# Patient Record
Sex: FEMALE | Race: WHITE | Employment: STUDENT
[De-identification: names, ages, dates, MRNs, and addresses within clinical notes are randomized per-mention and may not be internally consistent; named-entity substitution may affect disease eponyms.]

---
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
## 2022-02-03 ENCOUNTER — HOSPITAL ENCOUNTER (EMERGENCY)
Facility: HOSPITAL | Age: 22
Discharge: HOME | End: 2022-02-03
Attending: PEDIATRICS
Payer: COMMERCIAL

## 2022-02-03 ENCOUNTER — APPOINTMENT (OUTPATIENT)
Dept: GENERAL RADIOLOGY | Facility: HOSPITAL | Age: 22
End: 2022-02-03
Attending: PEDIATRICS
Payer: COMMERCIAL

## 2022-02-03 VITALS
RESPIRATION RATE: 16 BRPM | TEMPERATURE: 98 F | OXYGEN SATURATION: 100 % | DIASTOLIC BLOOD PRESSURE: 80 MMHG | HEART RATE: 93 BPM | SYSTOLIC BLOOD PRESSURE: 116 MMHG

## 2022-02-03 DIAGNOSIS — R07.9 CHEST PAIN OF UNCERTAIN ETIOLOGY: Primary | ICD-10-CM

## 2022-02-03 LAB
ALBUMIN SERPL-MCNC: 4.1 G/DL (ref 3.4–5)
ALBUMIN/GLOB SERPL: 1.1 {RATIO} (ref 1–2)
ALP LIVER SERPL-CCNC: 64 U/L
ALT SERPL-CCNC: 51 U/L
ANION GAP SERPL CALC-SCNC: 5 MMOL/L (ref 0–18)
AST SERPL-CCNC: 32 U/L (ref 15–37)
ATRIAL RATE: 114 BPM
BASOPHILS # BLD AUTO: 0.03 X10(3) UL (ref 0–0.2)
BASOPHILS NFR BLD AUTO: 0.5 %
BILIRUB SERPL-MCNC: 0.3 MG/DL (ref 0.1–2)
BUN BLD-MCNC: 4 MG/DL (ref 7–18)
CALCIUM BLD-MCNC: 9.4 MG/DL (ref 8.5–10.1)
CHLORIDE SERPL-SCNC: 109 MMOL/L (ref 98–112)
CO2 SERPL-SCNC: 22 MMOL/L (ref 21–32)
CREAT BLD-MCNC: 0.78 MG/DL
D DIMER PPP FEU-MCNC: 0.58 UG/ML FEU (ref ?–0.5)
EOSINOPHIL # BLD AUTO: 0.05 X10(3) UL (ref 0–0.7)
EOSINOPHIL NFR BLD AUTO: 0.8 %
ERYTHROCYTE [DISTWIDTH] IN BLOOD BY AUTOMATED COUNT: 13.2 %
GLOBULIN PLAS-MCNC: 3.8 G/DL (ref 2.8–4.4)
GLUCOSE BLD-MCNC: 79 MG/DL (ref 70–99)
HCG SERPL QL: NEGATIVE
HCT VFR BLD AUTO: 40.2 %
HGB BLD-MCNC: 13.3 G/DL
IMM GRANULOCYTES # BLD AUTO: 0.02 X10(3) UL (ref 0–1)
IMM GRANULOCYTES NFR BLD: 0.3 %
LYMPHOCYTES NFR BLD AUTO: 25.5 %
MAGNESIUM SERPL-MCNC: 2 MG/DL (ref 1.7–2.8)
MCH RBC QN AUTO: 30 PG (ref 26–34)
MCHC RBC AUTO-ENTMCNC: 33.1 G/DL (ref 31–37)
MCV RBC AUTO: 90.5 FL
MONOCYTES # BLD AUTO: 0.4 X10(3) UL (ref 0.1–1)
MONOCYTES NFR BLD AUTO: 6.2 %
NEUTROPHILS # BLD AUTO: 4.32 X10 (3) UL (ref 1.5–7.7)
NEUTROPHILS # BLD AUTO: 4.32 X10(3) UL (ref 1.5–7.7)
NEUTROPHILS NFR BLD AUTO: 66.7 %
OSMOLALITY SERPL CALC.SUM OF ELEC: 278 MOSM/KG (ref 275–295)
P AXIS: 57 DEGREES
P-R INTERVAL: 124 MS
PHOSPHATE SERPL-MCNC: 3.5 MG/DL (ref 2.5–4.9)
PLATELET # BLD AUTO: 283 10(3)UL (ref 150–450)
POTASSIUM SERPL-SCNC: 4.7 MMOL/L (ref 3.5–5.1)
PROT SERPL-MCNC: 7.9 G/DL (ref 6.4–8.2)
Q-T INTERVAL: 318 MS
QRS DURATION: 78 MS
QTC CALCULATION (BEZET): 438 MS
R AXIS: 57 DEGREES
RBC # BLD AUTO: 4.44 X10(6)UL
SODIUM SERPL-SCNC: 136 MMOL/L (ref 136–145)
T AXIS: 0 DEGREES
TROPONIN I HIGH SENSITIVITY: 3 NG/L
VENTRICULAR RATE: 114 BPM
WBC # BLD AUTO: 6.5 X10(3) UL (ref 4–11)

## 2022-02-03 PROCEDURE — 83735 ASSAY OF MAGNESIUM: CPT | Performed by: PEDIATRICS

## 2022-02-03 PROCEDURE — 99284 EMERGENCY DEPT VISIT MOD MDM: CPT | Performed by: PEDIATRICS

## 2022-02-03 PROCEDURE — 84484 ASSAY OF TROPONIN QUANT: CPT | Performed by: PEDIATRICS

## 2022-02-03 PROCEDURE — 93010 ELECTROCARDIOGRAM REPORT: CPT | Performed by: PEDIATRICS

## 2022-02-03 PROCEDURE — 85025 COMPLETE CBC W/AUTO DIFF WBC: CPT | Performed by: PEDIATRICS

## 2022-02-03 PROCEDURE — 71045 X-RAY EXAM CHEST 1 VIEW: CPT | Performed by: PEDIATRICS

## 2022-02-03 PROCEDURE — 85379 FIBRIN DEGRADATION QUANT: CPT | Performed by: PEDIATRICS

## 2022-02-03 PROCEDURE — 84703 CHORIONIC GONADOTROPIN ASSAY: CPT | Performed by: PEDIATRICS

## 2022-02-03 PROCEDURE — 84100 ASSAY OF PHOSPHORUS: CPT | Performed by: PEDIATRICS

## 2022-02-03 PROCEDURE — 93005 ELECTROCARDIOGRAM TRACING: CPT

## 2022-02-03 PROCEDURE — 80053 COMPREHEN METABOLIC PANEL: CPT | Performed by: PEDIATRICS

## 2022-02-03 PROCEDURE — 96360 HYDRATION IV INFUSION INIT: CPT | Performed by: PEDIATRICS

## 2022-02-03 NOTE — ED INITIAL ASSESSMENT (HPI)
Pt here for chest pain that has been going on for a week. Pt reports substernal chest pain that goes down the left arm. Pt reports heart rate increased with the episode.

## 2022-02-15 ENCOUNTER — APPOINTMENT (OUTPATIENT)
Dept: CT IMAGING | Age: 22
End: 2022-02-15
Attending: EMERGENCY MEDICINE
Payer: COMMERCIAL

## 2022-02-15 ENCOUNTER — HOSPITAL ENCOUNTER (EMERGENCY)
Age: 22
Discharge: HOME | End: 2022-02-15
Attending: EMERGENCY MEDICINE
Payer: COMMERCIAL

## 2022-02-15 VITALS
DIASTOLIC BLOOD PRESSURE: 75 MMHG | HEART RATE: 88 BPM | WEIGHT: 151.88 LBS | HEIGHT: 66 IN | RESPIRATION RATE: 15 BRPM | TEMPERATURE: 98 F | BODY MASS INDEX: 24.41 KG/M2 | SYSTOLIC BLOOD PRESSURE: 124 MMHG | OXYGEN SATURATION: 99 %

## 2022-02-15 DIAGNOSIS — N23 RENAL COLIC: ICD-10-CM

## 2022-02-15 DIAGNOSIS — N20.0 KIDNEY STONE: Primary | ICD-10-CM

## 2022-02-15 LAB
ALBUMIN SERPL-MCNC: 4.5 G/DL (ref 3.4–5)
ALBUMIN/GLOB SERPL: 1.4 {RATIO} (ref 1–2)
ALP LIVER SERPL-CCNC: 73 U/L
ALT SERPL-CCNC: 20 U/L
ANION GAP SERPL CALC-SCNC: 11 MMOL/L (ref 0–18)
AST SERPL-CCNC: 15 U/L (ref 15–37)
B-HCG UR QL: NEGATIVE
BASOPHILS # BLD AUTO: 0.03 X10(3) UL (ref 0–0.2)
BASOPHILS NFR BLD AUTO: 0.3 %
BILIRUB SERPL-MCNC: 0.4 MG/DL (ref 0.1–2)
BILIRUB UR QL CFM: NEGATIVE
BUN BLD-MCNC: 8 MG/DL (ref 7–18)
CALCIUM BLD-MCNC: 9.6 MG/DL (ref 8.5–10.1)
CHLORIDE SERPL-SCNC: 108 MMOL/L (ref 98–112)
CLARITY UR REFRACT.AUTO: CLEAR
CO2 SERPL-SCNC: 20 MMOL/L (ref 21–32)
COLOR UR AUTO: YELLOW
CREAT BLD-MCNC: 1.08 MG/DL
EOSINOPHIL # BLD AUTO: 0.05 X10(3) UL (ref 0–0.7)
EOSINOPHIL NFR BLD AUTO: 0.5 %
ERYTHROCYTE [DISTWIDTH] IN BLOOD BY AUTOMATED COUNT: 13.1 %
GLOBULIN PLAS-MCNC: 3.2 G/DL (ref 2.8–4.4)
GLUCOSE BLD-MCNC: 106 MG/DL (ref 70–99)
GLUCOSE UR STRIP.AUTO-MCNC: NEGATIVE MG/DL
HCT VFR BLD AUTO: 39.9 %
HGB BLD-MCNC: 13.6 G/DL
IMM GRANULOCYTES # BLD AUTO: 0.03 X10(3) UL (ref 0–1)
IMM GRANULOCYTES NFR BLD: 0.3 %
KETONES UR STRIP.AUTO-MCNC: 40 MG/DL
LEUKOCYTE ESTERASE UR QL STRIP.AUTO: NEGATIVE
LYMPHOCYTES # BLD AUTO: 1.48 X10(3) UL (ref 1.5–5)
LYMPHOCYTES NFR BLD AUTO: 13.4 %
MCH RBC QN AUTO: 30.4 PG (ref 26–34)
MCHC RBC AUTO-ENTMCNC: 34.1 G/DL (ref 31–37)
MCV RBC AUTO: 89.3 FL
MONOCYTES # BLD AUTO: 0.71 X10(3) UL (ref 0.1–1)
MONOCYTES NFR BLD AUTO: 6.4 %
NEUTROPHILS # BLD AUTO: 8.73 X10 (3) UL (ref 1.5–7.7)
NEUTROPHILS NFR BLD AUTO: 79.1 %
NITRITE UR QL STRIP.AUTO: NEGATIVE
OSMOLALITY SERPL CALC.SUM OF ELEC: 287 MOSM/KG (ref 275–295)
PH UR STRIP.AUTO: 5 [PH] (ref 5–8)
PLATELET # BLD AUTO: 326 10(3)UL (ref 150–450)
POTASSIUM SERPL-SCNC: 3.6 MMOL/L (ref 3.5–5.1)
PROT SERPL-MCNC: 7.7 G/DL (ref 6.4–8.2)
RBC # BLD AUTO: 4.47 X10(6)UL
SODIUM SERPL-SCNC: 139 MMOL/L (ref 136–145)
SP GR UR STRIP.AUTO: >=1.03 (ref 1–1.03)
UROBILINOGEN UR STRIP.AUTO-MCNC: 0.2 MG/DL
WBC # BLD AUTO: 11 X10(3) UL (ref 4–11)
YEAST UR QL: PRESENT /HPF

## 2022-02-15 PROCEDURE — 99284 EMERGENCY DEPT VISIT MOD MDM: CPT

## 2022-02-15 PROCEDURE — 81001 URINALYSIS AUTO W/SCOPE: CPT | Performed by: EMERGENCY MEDICINE

## 2022-02-15 PROCEDURE — 74176 CT ABD & PELVIS W/O CONTRAST: CPT | Performed by: EMERGENCY MEDICINE

## 2022-02-15 PROCEDURE — 96361 HYDRATE IV INFUSION ADD-ON: CPT

## 2022-02-15 PROCEDURE — 96376 TX/PRO/DX INJ SAME DRUG ADON: CPT

## 2022-02-15 PROCEDURE — 96375 TX/PRO/DX INJ NEW DRUG ADDON: CPT

## 2022-02-15 PROCEDURE — 80053 COMPREHEN METABOLIC PANEL: CPT | Performed by: EMERGENCY MEDICINE

## 2022-02-15 PROCEDURE — 81025 URINE PREGNANCY TEST: CPT

## 2022-02-15 PROCEDURE — 85025 COMPLETE CBC W/AUTO DIFF WBC: CPT | Performed by: EMERGENCY MEDICINE

## 2022-02-15 PROCEDURE — 96374 THER/PROPH/DIAG INJ IV PUSH: CPT

## 2022-02-15 RX ORDER — ONDANSETRON 4 MG/1
4 TABLET, ORALLY DISINTEGRATING ORAL EVERY 4 HOURS PRN
Qty: 10 TABLET | Refills: 0 | Status: SHIPPED | OUTPATIENT
Start: 2022-02-15 | End: 2022-02-15

## 2022-02-15 RX ORDER — MORPHINE SULFATE 4 MG/ML
4 INJECTION, SOLUTION INTRAMUSCULAR; INTRAVENOUS EVERY 30 MIN PRN
Status: DISCONTINUED | OUTPATIENT
Start: 2022-02-15 | End: 2022-02-15

## 2022-02-15 RX ORDER — HYDROCODONE BITARTRATE AND ACETAMINOPHEN 5; 325 MG/1; MG/1
1-2 TABLET ORAL EVERY 6 HOURS PRN
Qty: 20 TABLET | Refills: 0 | Status: SHIPPED | OUTPATIENT
Start: 2022-02-15 | End: 2022-02-20

## 2022-02-15 RX ORDER — KETOROLAC TROMETHAMINE 15 MG/ML
15 INJECTION, SOLUTION INTRAMUSCULAR; INTRAVENOUS ONCE
Status: COMPLETED | OUTPATIENT
Start: 2022-02-15 | End: 2022-02-15

## 2022-02-15 RX ORDER — ONDANSETRON 2 MG/ML
4 INJECTION INTRAMUSCULAR; INTRAVENOUS ONCE
Status: COMPLETED | OUTPATIENT
Start: 2022-02-15 | End: 2022-02-15

## 2022-02-15 RX ORDER — METOCLOPRAMIDE 10 MG/1
10 TABLET ORAL 3 TIMES DAILY PRN
Qty: 20 TABLET | Refills: 0 | Status: SHIPPED | OUTPATIENT
Start: 2022-02-15 | End: 2022-03-17

## 2022-03-27 ENCOUNTER — HOSPITAL ENCOUNTER (EMERGENCY)
Age: 22
Discharge: HOME | End: 2022-03-27
Attending: EMERGENCY MEDICINE
Payer: COMMERCIAL

## 2022-03-27 ENCOUNTER — APPOINTMENT (OUTPATIENT)
Dept: GENERAL RADIOLOGY | Age: 22
End: 2022-03-27
Attending: EMERGENCY MEDICINE
Payer: COMMERCIAL

## 2022-03-27 VITALS
HEART RATE: 85 BPM | BODY MASS INDEX: 22.5 KG/M2 | WEIGHT: 140 LBS | RESPIRATION RATE: 16 BRPM | DIASTOLIC BLOOD PRESSURE: 73 MMHG | TEMPERATURE: 99 F | HEIGHT: 66 IN | OXYGEN SATURATION: 100 % | SYSTOLIC BLOOD PRESSURE: 114 MMHG

## 2022-03-27 DIAGNOSIS — R55 SYNCOPE, UNSPECIFIED SYNCOPE TYPE: Primary | ICD-10-CM

## 2022-03-27 DIAGNOSIS — R07.89 CHEST PAIN, ATYPICAL: ICD-10-CM

## 2022-03-27 LAB
ALBUMIN SERPL-MCNC: 4.2 G/DL (ref 3.4–5)
ALBUMIN/GLOB SERPL: 1.3 {RATIO} (ref 1–2)
ALP LIVER SERPL-CCNC: 65 U/L
ALT SERPL-CCNC: 20 U/L
ANION GAP SERPL CALC-SCNC: 7 MMOL/L (ref 0–18)
AST SERPL-CCNC: 14 U/L (ref 15–37)
BASOPHILS # BLD AUTO: 0.03 X10(3) UL (ref 0–0.2)
BASOPHILS NFR BLD AUTO: 0.4 %
BILIRUB SERPL-MCNC: 0.5 MG/DL (ref 0.1–2)
BUN BLD-MCNC: 7 MG/DL (ref 7–18)
CHLORIDE SERPL-SCNC: 105 MMOL/L (ref 98–112)
CO2 SERPL-SCNC: 25 MMOL/L (ref 21–32)
CREAT BLD-MCNC: 0.72 MG/DL
D DIMER PPP FEU-MCNC: 0.48 UG/ML FEU (ref ?–0.5)
EOSINOPHIL # BLD AUTO: 0.07 X10(3) UL (ref 0–0.7)
EOSINOPHIL NFR BLD AUTO: 1 %
ERYTHROCYTE [DISTWIDTH] IN BLOOD BY AUTOMATED COUNT: 13.1 %
GLOBULIN PLAS-MCNC: 3.2 G/DL (ref 2.8–4.4)
GLUCOSE BLD-MCNC: 73 MG/DL (ref 70–99)
HCG SERPL QL: NEGATIVE
HCT VFR BLD AUTO: 40 %
HGB BLD-MCNC: 13.2 G/DL
IMM GRANULOCYTES # BLD AUTO: 0.01 X10(3) UL (ref 0–1)
IMM GRANULOCYTES NFR BLD: 0.1 %
LYMPHOCYTES # BLD AUTO: 1.78 X10(3) UL (ref 1.5–5)
LYMPHOCYTES NFR BLD AUTO: 25.9 %
MAGNESIUM SERPL-MCNC: 1.7 MG/DL (ref 1.6–2.6)
MCH RBC QN AUTO: 30 PG (ref 26–34)
MCHC RBC AUTO-ENTMCNC: 33 G/DL (ref 31–37)
MCV RBC AUTO: 90.9 FL
MONOCYTES # BLD AUTO: 0.34 X10(3) UL (ref 0.1–1)
MONOCYTES NFR BLD AUTO: 4.9 %
NEUTROPHILS # BLD AUTO: 4.65 X10 (3) UL (ref 1.5–7.7)
NEUTROPHILS # BLD AUTO: 4.65 X10(3) UL (ref 1.5–7.7)
NEUTROPHILS NFR BLD AUTO: 67.7 %
OSMOLALITY SERPL CALC.SUM OF ELEC: 281 MOSM/KG (ref 275–295)
PLATELET # BLD AUTO: 326 10(3)UL (ref 150–450)
POTASSIUM SERPL-SCNC: 3.6 MMOL/L (ref 3.5–5.1)
PROT SERPL-MCNC: 7.4 G/DL (ref 6.4–8.2)
RBC # BLD AUTO: 4.4 X10(6)UL
SODIUM SERPL-SCNC: 137 MMOL/L (ref 136–145)
TROPONIN I HIGH SENSITIVITY: 4 NG/L
WBC # BLD AUTO: 6.9 X10(3) UL (ref 4–11)

## 2022-03-27 PROCEDURE — 80053 COMPREHEN METABOLIC PANEL: CPT | Performed by: EMERGENCY MEDICINE

## 2022-03-27 PROCEDURE — 83735 ASSAY OF MAGNESIUM: CPT | Performed by: EMERGENCY MEDICINE

## 2022-03-27 PROCEDURE — 96360 HYDRATION IV INFUSION INIT: CPT

## 2022-03-27 PROCEDURE — 71045 X-RAY EXAM CHEST 1 VIEW: CPT | Performed by: EMERGENCY MEDICINE

## 2022-03-27 PROCEDURE — 84484 ASSAY OF TROPONIN QUANT: CPT | Performed by: EMERGENCY MEDICINE

## 2022-03-27 PROCEDURE — 93010 ELECTROCARDIOGRAM REPORT: CPT

## 2022-03-27 PROCEDURE — 93005 ELECTROCARDIOGRAM TRACING: CPT

## 2022-03-27 PROCEDURE — 84703 CHORIONIC GONADOTROPIN ASSAY: CPT | Performed by: EMERGENCY MEDICINE

## 2022-03-27 PROCEDURE — 85379 FIBRIN DEGRADATION QUANT: CPT | Performed by: EMERGENCY MEDICINE

## 2022-03-27 PROCEDURE — 99285 EMERGENCY DEPT VISIT HI MDM: CPT

## 2022-03-27 PROCEDURE — 85025 COMPLETE CBC W/AUTO DIFF WBC: CPT | Performed by: EMERGENCY MEDICINE

## 2022-03-27 NOTE — ED INITIAL ASSESSMENT (HPI)
Pt reports for 2 weeks feeling dizziness and intermittent  Chest pain, last 30 minutes approx. Reports having syncopal episodes.  Last episode this am

## 2022-03-28 NOTE — ED QUICK NOTES
Patient with c/o of burning to the IV site, requesting to only have one bag of 0.9 NS instead of two. MD notified.

## 2022-03-29 LAB
ATRIAL RATE: 120 BPM
P AXIS: 76 DEGREES
P-R INTERVAL: 128 MS
Q-T INTERVAL: 300 MS
QRS DURATION: 74 MS
QTC CALCULATION (BEZET): 424 MS
R AXIS: 77 DEGREES
T AXIS: -55 DEGREES
VENTRICULAR RATE: 120 BPM

## 2023-10-25 ENCOUNTER — APPOINTMENT (OUTPATIENT)
Dept: GENERAL RADIOLOGY | Age: 23
End: 2023-10-25
Attending: EMERGENCY MEDICINE
Payer: COMMERCIAL

## 2023-10-25 ENCOUNTER — HOSPITAL ENCOUNTER (EMERGENCY)
Age: 23
Discharge: HOME | End: 2023-10-26
Attending: EMERGENCY MEDICINE
Payer: COMMERCIAL

## 2023-10-25 DIAGNOSIS — R51.9 NONINTRACTABLE HEADACHE, UNSPECIFIED CHRONICITY PATTERN, UNSPECIFIED HEADACHE TYPE: Primary | ICD-10-CM

## 2023-10-25 PROCEDURE — 96375 TX/PRO/DX INJ NEW DRUG ADDON: CPT

## 2023-10-25 PROCEDURE — 72072 X-RAY EXAM THORAC SPINE 3VWS: CPT | Performed by: EMERGENCY MEDICINE

## 2023-10-25 PROCEDURE — 99284 EMERGENCY DEPT VISIT MOD MDM: CPT

## 2023-10-25 PROCEDURE — 99285 EMERGENCY DEPT VISIT HI MDM: CPT

## 2023-10-25 PROCEDURE — 96374 THER/PROPH/DIAG INJ IV PUSH: CPT

## 2023-10-25 PROCEDURE — 96361 HYDRATE IV INFUSION ADD-ON: CPT

## 2023-10-25 PROCEDURE — 72110 X-RAY EXAM L-2 SPINE 4/>VWS: CPT | Performed by: EMERGENCY MEDICINE

## 2023-10-25 RX ORDER — ATOMOXETINE 60 MG/1
60 CAPSULE ORAL EVERY MORNING
COMMUNITY
Start: 2023-03-01

## 2023-10-25 RX ORDER — DIPHENHYDRAMINE HYDROCHLORIDE 50 MG/ML
25 INJECTION INTRAMUSCULAR; INTRAVENOUS ONCE
Status: COMPLETED | OUTPATIENT
Start: 2023-10-25 | End: 2023-10-26

## 2023-10-25 RX ORDER — PHENAZOPYRIDINE HYDROCHLORIDE 100 MG/1
200 TABLET, FILM COATED ORAL 3 TIMES DAILY PRN
COMMUNITY
Start: 2022-11-13

## 2023-10-25 RX ORDER — METOCLOPRAMIDE HYDROCHLORIDE 5 MG/ML
10 INJECTION INTRAMUSCULAR; INTRAVENOUS ONCE
Status: COMPLETED | OUTPATIENT
Start: 2023-10-25 | End: 2023-10-25

## 2023-10-25 RX ORDER — LURASIDONE HYDROCHLORIDE 20 MG/1
20 TABLET, FILM COATED ORAL NIGHTLY
COMMUNITY
Start: 2023-02-27

## 2023-10-25 RX ORDER — HYDROXYZINE 50 MG/1
25 TABLET, FILM COATED ORAL EVERY 8 HOURS PRN
COMMUNITY
Start: 2023-02-27

## 2023-10-25 RX ORDER — KETOROLAC TROMETHAMINE 15 MG/ML
15 INJECTION, SOLUTION INTRAMUSCULAR; INTRAVENOUS ONCE
Status: COMPLETED | OUTPATIENT
Start: 2023-10-25 | End: 2023-10-25

## 2023-10-25 RX ORDER — METOCLOPRAMIDE 10 MG/1
10 TABLET ORAL EVERY 6 HOURS PRN
COMMUNITY
Start: 2022-09-22

## 2023-10-26 VITALS
OXYGEN SATURATION: 98 % | BODY MASS INDEX: 23 KG/M2 | TEMPERATURE: 98 F | HEART RATE: 77 BPM | SYSTOLIC BLOOD PRESSURE: 108 MMHG | RESPIRATION RATE: 18 BRPM | DIASTOLIC BLOOD PRESSURE: 71 MMHG | WEIGHT: 140 LBS

## 2023-10-26 PROCEDURE — 96376 TX/PRO/DX INJ SAME DRUG ADON: CPT

## 2023-10-26 PROCEDURE — 96375 TX/PRO/DX INJ NEW DRUG ADDON: CPT

## 2023-10-26 PROCEDURE — 96361 HYDRATE IV INFUSION ADD-ON: CPT

## 2023-10-26 RX ORDER — ONDANSETRON 2 MG/ML
4 INJECTION INTRAMUSCULAR; INTRAVENOUS ONCE
Status: COMPLETED | OUTPATIENT
Start: 2023-10-26 | End: 2023-10-26

## 2023-10-26 RX ORDER — ONDANSETRON 2 MG/ML
INJECTION INTRAMUSCULAR; INTRAVENOUS
Status: COMPLETED
Start: 2023-10-26 | End: 2023-10-26

## 2023-10-26 RX ORDER — DIAZEPAM 5 MG/1
5 TABLET ORAL ONCE
Status: COMPLETED | OUTPATIENT
Start: 2023-10-26 | End: 2023-10-26

## 2023-10-26 RX ORDER — KETOROLAC TROMETHAMINE 15 MG/ML
15 INJECTION, SOLUTION INTRAMUSCULAR; INTRAVENOUS ONCE
Status: COMPLETED | OUTPATIENT
Start: 2023-10-26 | End: 2023-10-26

## 2023-10-26 RX ORDER — KETOROLAC TROMETHAMINE 15 MG/ML
INJECTION, SOLUTION INTRAMUSCULAR; INTRAVENOUS
Status: COMPLETED
Start: 2023-10-26 | End: 2023-10-26

## 2023-10-26 NOTE — DISCHARGE INSTRUCTIONS
Please follow-up with your neurologist discussed your ER visit and your migraine headache. Return if symptoms worsen progress or if you develop double vision.

## 2023-10-29 ENCOUNTER — HOSPITAL ENCOUNTER (EMERGENCY)
Facility: HOSPITAL | Age: 23
Discharge: HOME | End: 2023-10-29
Attending: STUDENT IN AN ORGANIZED HEALTH CARE EDUCATION/TRAINING PROGRAM
Payer: COMMERCIAL

## 2023-10-29 ENCOUNTER — APPOINTMENT (OUTPATIENT)
Dept: CT IMAGING | Facility: HOSPITAL | Age: 23
End: 2023-10-29
Attending: STUDENT IN AN ORGANIZED HEALTH CARE EDUCATION/TRAINING PROGRAM
Payer: COMMERCIAL

## 2023-10-29 VITALS
DIASTOLIC BLOOD PRESSURE: 84 MMHG | TEMPERATURE: 99 F | SYSTOLIC BLOOD PRESSURE: 125 MMHG | HEIGHT: 66 IN | HEART RATE: 118 BPM | OXYGEN SATURATION: 96 % | WEIGHT: 143.31 LBS | BODY MASS INDEX: 23.03 KG/M2 | RESPIRATION RATE: 19 BRPM

## 2023-10-29 DIAGNOSIS — G43.909 MIGRAINE WITHOUT STATUS MIGRAINOSUS, NOT INTRACTABLE, UNSPECIFIED MIGRAINE TYPE: Primary | ICD-10-CM

## 2023-10-29 LAB
ANION GAP SERPL CALC-SCNC: 3 MMOL/L (ref 0–18)
BASOPHILS # BLD AUTO: 0.03 X10(3) UL (ref 0–0.2)
BASOPHILS NFR BLD AUTO: 0.3 %
BUN BLD-MCNC: 12 MG/DL (ref 7–18)
CALCIUM BLD-MCNC: 9.1 MG/DL (ref 8.5–10.1)
CHLORIDE SERPL-SCNC: 107 MMOL/L (ref 98–112)
CO2 SERPL-SCNC: 29 MMOL/L (ref 21–32)
CREAT BLD-MCNC: 1.02 MG/DL
EGFRCR SERPLBLD CKD-EPI 2021: 80 ML/MIN/1.73M2 (ref 60–?)
EOSINOPHIL # BLD AUTO: 0.09 X10(3) UL (ref 0–0.7)
EOSINOPHIL NFR BLD AUTO: 0.9 %
ERYTHROCYTE [DISTWIDTH] IN BLOOD BY AUTOMATED COUNT: 13.8 %
GLUCOSE BLD-MCNC: 102 MG/DL (ref 70–99)
HCT VFR BLD AUTO: 38 %
HGB BLD-MCNC: 12.3 G/DL
IMM GRANULOCYTES # BLD AUTO: 0.03 X10(3) UL (ref 0–1)
IMM GRANULOCYTES NFR BLD: 0.3 %
LYMPHOCYTES # BLD AUTO: 2 X10(3) UL (ref 1–4)
LYMPHOCYTES NFR BLD AUTO: 19.3 %
MCH RBC QN AUTO: 27.6 PG (ref 26–34)
MCHC RBC AUTO-ENTMCNC: 32.4 G/DL (ref 31–37)
MCV RBC AUTO: 85.4 FL
MONOCYTES # BLD AUTO: 0.48 X10(3) UL (ref 0.1–1)
MONOCYTES NFR BLD AUTO: 4.6 %
NEUTROPHILS # BLD AUTO: 7.73 X10 (3) UL (ref 1.5–7.7)
NEUTROPHILS # BLD AUTO: 7.73 X10(3) UL (ref 1.5–7.7)
NEUTROPHILS NFR BLD AUTO: 74.6 %
OSMOLALITY SERPL CALC.SUM OF ELEC: 288 MOSM/KG (ref 275–295)
PLATELET # BLD AUTO: 372 10(3)UL (ref 150–450)
POTASSIUM SERPL-SCNC: 4.2 MMOL/L (ref 3.5–5.1)
RBC # BLD AUTO: 4.45 X10(6)UL
SARS-COV-2 RNA RESP QL NAA+PROBE: NOT DETECTED
SODIUM SERPL-SCNC: 139 MMOL/L (ref 136–145)
WBC # BLD AUTO: 10.4 X10(3) UL (ref 4–11)

## 2023-10-29 PROCEDURE — 80048 BASIC METABOLIC PNL TOTAL CA: CPT | Performed by: STUDENT IN AN ORGANIZED HEALTH CARE EDUCATION/TRAINING PROGRAM

## 2023-10-29 PROCEDURE — 64450 NJX AA&/STRD OTHER PN/BRANCH: CPT

## 2023-10-29 PROCEDURE — 96361 HYDRATE IV INFUSION ADD-ON: CPT

## 2023-10-29 PROCEDURE — 85025 COMPLETE CBC W/AUTO DIFF WBC: CPT | Performed by: STUDENT IN AN ORGANIZED HEALTH CARE EDUCATION/TRAINING PROGRAM

## 2023-10-29 PROCEDURE — 99284 EMERGENCY DEPT VISIT MOD MDM: CPT

## 2023-10-29 PROCEDURE — 70450 CT HEAD/BRAIN W/O DYE: CPT | Performed by: STUDENT IN AN ORGANIZED HEALTH CARE EDUCATION/TRAINING PROGRAM

## 2023-10-29 PROCEDURE — 96374 THER/PROPH/DIAG INJ IV PUSH: CPT

## 2023-10-29 PROCEDURE — 99285 EMERGENCY DEPT VISIT HI MDM: CPT

## 2023-10-29 RX ORDER — BUPIVACAINE HYDROCHLORIDE 5 MG/ML
5 INJECTION, SOLUTION EPIDURAL; INTRACAUDAL ONCE
Status: COMPLETED | OUTPATIENT
Start: 2023-10-29 | End: 2023-10-29

## 2023-10-29 RX ORDER — DROPERIDOL 2.5 MG/ML
2.5 INJECTION, SOLUTION INTRAMUSCULAR; INTRAVENOUS ONCE
Status: COMPLETED | OUTPATIENT
Start: 2023-10-29 | End: 2023-10-29

## 2023-10-29 NOTE — ED INITIAL ASSESSMENT (HPI)
Pt ambulatory to er with ha since Tuesday- tx at PED and sent home - states home with neck pain  Took home meds = no relief this am  Denies injuries  Hx of csf leakage - Dr Jose Luis Land

## 2024-03-12 ENCOUNTER — APPOINTMENT (OUTPATIENT)
Dept: CT IMAGING | Age: 24
End: 2024-03-12
Attending: STUDENT IN AN ORGANIZED HEALTH CARE EDUCATION/TRAINING PROGRAM

## 2024-03-12 ENCOUNTER — HOSPITAL ENCOUNTER (EMERGENCY)
Age: 24
Discharge: HOME | End: 2024-03-12

## 2024-03-12 ENCOUNTER — APPOINTMENT (OUTPATIENT)
Dept: GENERAL RADIOLOGY | Age: 24
End: 2024-03-12
Attending: STUDENT IN AN ORGANIZED HEALTH CARE EDUCATION/TRAINING PROGRAM

## 2024-03-12 VITALS
RESPIRATION RATE: 18 BRPM | SYSTOLIC BLOOD PRESSURE: 104 MMHG | OXYGEN SATURATION: 98 % | HEART RATE: 118 BPM | TEMPERATURE: 97.7 F | DIASTOLIC BLOOD PRESSURE: 72 MMHG
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
## 2024-03-12 DIAGNOSIS — M54.2 NECK PAIN ON RIGHT SIDE: ICD-10-CM

## 2024-03-12 LAB
ATRIAL RATE (BPM): 142
HCG UR QL: NEGATIVE
P AXIS (DEGREES): 57
PR-INTERVAL (MSEC): 113
QRS-INTERVAL (MSEC): 80
QT-INTERVAL (MSEC): 291
QTC: 447
R AXIS (DEGREES): 78
REPORT TEXT: NORMAL
T AXIS (DEGREES): -18
VENTRICULAR RATE EKG/MIN (BPM): 142

## 2024-03-12 PROCEDURE — 70450 CT HEAD/BRAIN W/O DYE: CPT

## 2024-03-12 PROCEDURE — 84703 CHORIONIC GONADOTROPIN ASSAY: CPT

## 2024-03-12 PROCEDURE — 10002803 HB RX 637: Performed by: STUDENT IN AN ORGANIZED HEALTH CARE EDUCATION/TRAINING PROGRAM

## 2024-03-12 PROCEDURE — 93010 ELECTROCARDIOGRAM REPORT: CPT | Performed by: INTERNAL MEDICINE

## 2024-03-12 PROCEDURE — 72128 CT CHEST SPINE W/O DYE: CPT

## 2024-03-12 PROCEDURE — 99285 EMERGENCY DEPT VISIT HI MDM: CPT

## 2024-03-12 PROCEDURE — 10004651 HB RX, NO CHARGE ITEM: Performed by: STUDENT IN AN ORGANIZED HEALTH CARE EDUCATION/TRAINING PROGRAM

## 2024-03-12 PROCEDURE — 72125 CT NECK SPINE W/O DYE: CPT

## 2024-03-12 PROCEDURE — 99284 EMERGENCY DEPT VISIT MOD MDM: CPT | Performed by: STUDENT IN AN ORGANIZED HEALTH CARE EDUCATION/TRAINING PROGRAM

## 2024-03-12 PROCEDURE — 93005 ELECTROCARDIOGRAM TRACING: CPT | Performed by: STUDENT IN AN ORGANIZED HEALTH CARE EDUCATION/TRAINING PROGRAM

## 2024-03-12 PROCEDURE — 71046 X-RAY EXAM CHEST 2 VIEWS: CPT

## 2024-03-12 RX ORDER — ACETAMINOPHEN 325 MG/1
975 TABLET ORAL ONCE
Status: COMPLETED | OUTPATIENT
Start: 2024-03-12 | End: 2024-03-12

## 2024-03-12 RX ORDER — DIAZEPAM 5 MG/1
5 TABLET ORAL ONCE
Status: COMPLETED | OUTPATIENT
Start: 2024-03-12 | End: 2024-03-12

## 2024-03-12 RX ORDER — DIAZEPAM 5 MG/1
5 TABLET ORAL EVERY 8 HOURS PRN
Qty: 10 TABLET | Refills: 0 | Status: SHIPPED | OUTPATIENT
Start: 2024-03-12

## 2024-03-12 RX ADMIN — ACETAMINOPHEN 975 MG: 325 TABLET ORAL at 10:05

## 2024-03-12 RX ADMIN — DIAZEPAM 5 MG: 5 TABLET ORAL at 12:17

## 2024-03-12 ASSESSMENT — PAIN SCALES - GENERAL
PAINLEVEL_OUTOF10: 2
PAINLEVEL_OUTOF10: 8

## 2024-03-12 ASSESSMENT — ENCOUNTER SYMPTOMS
ABDOMINAL PAIN: 0
BACK PAIN: 1

## 2024-05-03 ENCOUNTER — PATIENT MESSAGE (OUTPATIENT)
Dept: INTEGRATIVE MEDICINE | Facility: CLINIC | Age: 24
End: 2024-05-03

## 2024-05-03 ENCOUNTER — OFFICE VISIT (OUTPATIENT)
Dept: INTEGRATIVE MEDICINE | Facility: CLINIC | Age: 24
End: 2024-05-03
Payer: COMMERCIAL

## 2024-05-03 VITALS — DIASTOLIC BLOOD PRESSURE: 80 MMHG | SYSTOLIC BLOOD PRESSURE: 100 MMHG | HEART RATE: 130 BPM | OXYGEN SATURATION: 97 %

## 2024-05-03 DIAGNOSIS — G90.1 DYSAUTONOMIA (HCC): ICD-10-CM

## 2024-05-03 DIAGNOSIS — R65.10 SYSTEMIC INFLAMMATORY RESPONSE SYNDROME DUE TO NON-INFECTIOUS PROCESS WITHOUT ACUTE ORGAN DYSFUNCTION (HCC): Primary | ICD-10-CM

## 2024-05-03 DIAGNOSIS — D84.1 DISORDER OF COMPLEMENT (HCC): ICD-10-CM

## 2024-05-03 DIAGNOSIS — R53.83 OTHER FATIGUE: ICD-10-CM

## 2024-05-03 PROCEDURE — 3074F SYST BP LT 130 MM HG: CPT | Performed by: PHYSICIAN ASSISTANT

## 2024-05-03 PROCEDURE — 3079F DIAST BP 80-89 MM HG: CPT | Performed by: PHYSICIAN ASSISTANT

## 2024-05-03 PROCEDURE — 99215 OFFICE O/P EST HI 40 MIN: CPT | Performed by: PHYSICIAN ASSISTANT

## 2024-05-03 RX ORDER — TRAZODONE HYDROCHLORIDE 50 MG/1
50 TABLET ORAL NIGHTLY
COMMUNITY
Start: 2024-01-23

## 2024-05-03 RX ORDER — MELATONIN
1 NIGHTLY PRN
COMMUNITY
Start: 2024-04-07

## 2024-05-03 RX ORDER — BUSPIRONE HYDROCHLORIDE 10 MG/1
TABLET ORAL
COMMUNITY
Start: 2024-04-01

## 2024-05-03 RX ORDER — TRAMADOL HYDROCHLORIDE 50 MG/1
1 TABLET ORAL EVERY 12 HOURS PRN
COMMUNITY
Start: 2023-02-03

## 2024-05-03 RX ORDER — LAMOTRIGINE 200 MG/1
200 TABLET ORAL EVERY MORNING
COMMUNITY
Start: 2024-04-17

## 2024-05-03 RX ORDER — OMEPRAZOLE 20 MG/1
CAPSULE, DELAYED RELEASE ORAL
COMMUNITY
Start: 2024-04-29

## 2024-05-03 RX ORDER — LACTOBACILLUS RHAMNOSUS GG 10B CELL
1 CAPSULE ORAL EVERY MORNING
COMMUNITY

## 2024-05-03 RX ORDER — PROMETHAZINE HYDROCHLORIDE 12.5 MG/1
1 TABLET ORAL EVERY 6 HOURS PRN
COMMUNITY
Start: 2024-03-08

## 2024-05-03 RX ORDER — RISPERIDONE 1 MG/1
TABLET ORAL
COMMUNITY
Start: 2024-04-01

## 2024-05-03 RX ORDER — BUTALBITAL, ACETAMINOPHEN, CAFFEINE AND CODEINE PHOSPHATE 50; 325; 40; 30 MG/1; MG/1; MG/1; MG/1
1 CAPSULE ORAL
COMMUNITY

## 2024-05-03 RX ORDER — RISPERIDONE 0.5 MG/1
0.5 TABLET ORAL NIGHTLY
COMMUNITY
Start: 2024-04-02

## 2024-05-03 RX ORDER — ERENUMAB-AOOE 140 MG/ML
INJECTION, SOLUTION SUBCUTANEOUS
COMMUNITY
Start: 2024-05-02

## 2024-05-03 NOTE — PATIENT INSTRUCTIONS
Please get testing at [facility]     [email or web address]    Please perform the VCS test above and give me the results
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
## 2024-05-08 ENCOUNTER — HOSPITAL ENCOUNTER (OUTPATIENT)
Age: 24
Discharge: HOME | End: 2024-05-08
Attending: EMERGENCY MEDICINE
Payer: COMMERCIAL

## 2024-05-08 VITALS
BODY MASS INDEX: 23 KG/M2 | OXYGEN SATURATION: 100 % | HEIGHT: 66 IN | TEMPERATURE: 99 F | DIASTOLIC BLOOD PRESSURE: 82 MMHG | SYSTOLIC BLOOD PRESSURE: 117 MMHG | RESPIRATION RATE: 18 BRPM | HEART RATE: 101 BPM

## 2024-05-08 DIAGNOSIS — E86.0 DEHYDRATION: Primary | ICD-10-CM

## 2024-05-08 LAB
#MXD IC: 0.4 X10ˆ3/UL (ref 0.1–1)
BILIRUB UR QL STRIP: NEGATIVE
BUN BLD-MCNC: 9 MG/DL (ref 7–18)
CHLORIDE BLD-SCNC: 104 MMOL/L (ref 98–112)
CO2 BLD-SCNC: 25 MMOL/L (ref 21–32)
COLOR UR: YELLOW
CREAT BLD-MCNC: 0.6 MG/DL
EGFRCR SERPLBLD CKD-EPI 2021: 131 ML/MIN/1.73M2 (ref 60–?)
GLUCOSE BLD-MCNC: 116 MG/DL (ref 70–99)
GLUCOSE UR STRIP-MCNC: NEGATIVE MG/DL
HCT VFR BLD AUTO: 32.4 %
HCT VFR BLD CALC: 31 %
HGB BLD-MCNC: 10 G/DL
HGB UR QL STRIP: NEGATIVE
ISTAT IONIZED CALCIUM FOR CHEM 8: 1.22 MMOL/L (ref 1.12–1.32)
KETONES UR STRIP-MCNC: NEGATIVE MG/DL
LEUKOCYTE ESTERASE UR QL STRIP: NEGATIVE
LYMPHOCYTES # BLD AUTO: 1.5 X10ˆ3/UL (ref 1–4)
LYMPHOCYTES NFR BLD AUTO: 20.5 %
MCH RBC QN AUTO: 24.9 PG (ref 26–34)
MCHC RBC AUTO-ENTMCNC: 30.9 G/DL (ref 31–37)
MCV RBC AUTO: 80.8 FL (ref 80–100)
MIXED CELL %: 6.3 %
NEUTROPHILS # BLD AUTO: 5.2 X10ˆ3/UL (ref 1.5–7.7)
NEUTROPHILS NFR BLD AUTO: 73.2 %
NITRITE UR QL STRIP: NEGATIVE
PH UR STRIP: 6 [PH]
PLATELET # BLD AUTO: 435 X10ˆ3/UL (ref 150–450)
POTASSIUM BLD-SCNC: 3.9 MMOL/L (ref 3.6–5.1)
PROT UR STRIP-MCNC: NEGATIVE MG/DL
RBC # BLD AUTO: 4.01 X10ˆ6/UL
SARS-COV-2 RNA RESP QL NAA+PROBE: NOT DETECTED
SODIUM BLD-SCNC: 139 MMOL/L (ref 136–145)
SP GR UR STRIP: >=1.03
UROBILINOGEN UR STRIP-ACNC: <2 MG/DL
WBC # BLD AUTO: 7.1 X10ˆ3/UL (ref 4–11)

## 2024-05-08 PROCEDURE — 81002 URINALYSIS NONAUTO W/O SCOPE: CPT

## 2024-05-08 PROCEDURE — 80047 BASIC METABLC PNL IONIZED CA: CPT

## 2024-05-08 PROCEDURE — 85025 COMPLETE CBC W/AUTO DIFF WBC: CPT | Performed by: EMERGENCY MEDICINE

## 2024-05-08 PROCEDURE — 99215 OFFICE O/P EST HI 40 MIN: CPT

## 2024-05-08 PROCEDURE — 93010 ELECTROCARDIOGRAM REPORT: CPT

## 2024-05-08 PROCEDURE — 81025 URINE PREGNANCY TEST: CPT

## 2024-05-08 PROCEDURE — 93005 ELECTROCARDIOGRAM TRACING: CPT

## 2024-05-08 PROCEDURE — 99214 OFFICE O/P EST MOD 30 MIN: CPT

## 2024-05-08 PROCEDURE — 96361 HYDRATE IV INFUSION ADD-ON: CPT

## 2024-05-08 PROCEDURE — 96374 THER/PROPH/DIAG INJ IV PUSH: CPT

## 2024-05-08 RX ORDER — SODIUM CHLORIDE 9 MG/ML
1000 INJECTION, SOLUTION INTRAVENOUS ONCE
Status: COMPLETED | OUTPATIENT
Start: 2024-05-08 | End: 2024-05-08

## 2024-05-08 RX ORDER — ONDANSETRON 2 MG/ML
4 INJECTION INTRAMUSCULAR; INTRAVENOUS ONCE
Status: COMPLETED | OUTPATIENT
Start: 2024-05-08 | End: 2024-05-08

## 2024-05-08 NOTE — ED PROVIDER NOTES
Patient Seen in: [facility]      History     Chief Complaint   Patient presents with    Dizziness     Stated Complaint: dizziness    Subjective:   HPI    Patient with a history of chronic insomnia, chronic recurrent abdominal pain, complex regional pain syndrome with nerve stimulator currently nonfunctional, chronic fatigue, eating disorder, migraines, hypoglycemia, dysmenorrhea, systemic inflammatory response syndrome, and dysautonomia.  Medications from her office visit 5 days ago include tramadol, trazodone, risperidone, BuSpar, butalbital/APAP, methocarbamol and sertraline among others.  As mentioned, she was to a primary care provider less than a week ago to establish care.  Over 25 laboratories were ordered.    Patient is currently in a eating disorder treatment program.  She restricts intake.  She reports that she is only taking about 30 ounces of fluid every other day.  Today, patient is having a migraine headache, not uncommon for her.  She also woke up with a fever.  She reports a temperature of 100.7 °F.  She felt chills.  She is felt her heart pounding today and lightheaded.  She had diarrhea yesterday and again today, 1 time each day.  No burning with urination, or blood in the urine.  No vomiting but she does feel nauseous.  Other than her chronic pains, no new pain.  No abdominal pain.  No flank pain.  No sore throat, stuffy nose, or congestion.      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    waiting on genetic testing confirmation              Past Surgical History:   Procedure Laterality Date    Appendectomy  2017    Endoscopy of bowel pouch  2012    EGD    Other surgical history      epidurals    Other  surgical history  2020    spinal stimulatro x 2    Other surgical history  2021    blood patches                Social History     Socioeconomic History    Marital status: Single   Tobacco Use    Smoking status: Never    Smokeless tobacco: Never   Vaping Use    Vaping status: Never Used   Substance and Sexual Activity    Alcohol use: Yes     Comment: social    Drug use: No    Sexual activity: Never     Partners: Male     Birth control/protection: Abstinence     Social Determinants of Health     Food Insecurity: No Food Insecurity (4/3/2023)    [facility]    Hunger Vital Sign     Worried About Running Out of Food in the Last Year: Never true     Ran Out of Food in the Last Year: Never true    Received from [facility]              Review of Systems  Throat:  Positive for stated complaint: dizziness  Other systems are as noted in HPI.  Constitutional and vital signs reviewed.      All other systems reviewed and negative except as noted above.    Physical Exam     ED Triage Vitals [05/08/24 1618]   /89   Pulse (!) 136   Resp 18   Temp 98.9 °F (37.2 °C)   Temp src Oral   SpO2 99 %   O2 Device None (Room air)       Current Vitals:   Vital Signs  BP: 117/82  Pulse: 101  Resp: 18  Temp: 98.9 °F (37.2 °C)  Temp src: Oral    Oxygen Therapy  SpO2: 100 %  O2 Device: None (Room air)            Physical Exam  General: The patient is awake, alert, conversant.  She is a very pleasant and smiling despite her predicament.  Eyes: sclera white, conjunctiva pink and moist.  Lids and lashes are normal.  Pupils equal, round, and reactive  Throat: posterior pharynx is nonerythematous without exudate  Ears: TMs are normal bilaterally  Neck: Supple  Lungs: Clear to auscultation bilaterally.  No rhonchi or rales.  Heart: Fast normal S1 and S2, without murmur.  Distal pulses are strong and symmetric.  Neurologic:  Mental status as  above.  Patient moves all extremities with good strength and coordination.           ED Course     Labs Reviewed   POCT CBC - Abnormal; Notable for the following components:       Result Value    HGB IC 10.0 (*)     HCT IC 32.4 (*)     MCH IC 24.9 (*)     MCHC IC 30.9 (*)     All other components within normal limits   [facility] POCT URINALYSIS DIPSTICK - Abnormal; Notable for the following components:    Urine Clarity Slightly cloudy (*)     All other components within normal limits   POCT ISTAT CHEM8 CARTRIDGE - Abnormal; Notable for the following components:    ISTAT Hematocrit 31 (*)     ISTAT Glucose 116 (*)     All other components within normal limits   RAPID SARS-COV-2 BY PCR - Normal             An EKG was performed. I agree with computerized EKG interval interpretations. EKG shows sinus tachycardia. There are no acute ST changes to suggest acute ischemia or infarct  Ventricular rate 132 bpm. MI interval 126 ms. QRS duration 70 ms.           MDM     Patient with eating disorder/restrictive complaining of feeling lightheaded and her heart racing along with her chronic pain issues and chronic recurrent migraine.  Her treatment team advised her to seek care in urgent care for IV fluid replacement.  It was recommended that she have a full 2 L of normal saline  I discussed with patient and parent that diagnostic capabilities at this urgent care are limited.  Some electrolytes cannot be tested.   Also concerning is the fever that patient had this morning.  She is afebrile currently.  She did have loose bowel movements yesterday and today suggesting possibility of infectious gastroenteritis.  No URI symptoms.  No urinary symptoms.    Patient treated with IV fluid by bolus.  She was offered nausea medicine    I-STAT shows normal electrolytes and creatinine  COVID test negative  Hemogram shows normal white count with anemia requiring follow-up.  Platelets adequate  Urine dip shows high specific gravity consistent with  dehydration.  There were negative nitrites, leukocytes, ketones  Urine pregnancy negative    Repeat vitals show a pulse now 101.    Patient was feeling better.  At this point, I believe patient may be safely discharged.  I recommend close follow-up with her doctors.  Mother notes that patient was advised by her team that she may require more fluids on Saturday.  Mother was questioning whether or not it would be an issue to come back to the urgent care for these fluids.  It may be more cost effective and convenient to schedule an appointment at an infusion center and patient was encouraged to contact her doctors regarding this.  She had apparently had standing orders in the past                                 Medical Decision Making      Disposition and Plan     Clinical Impression:  1. Dehydration         Disposition:  Discharge  5/8/2024  6:44 pm    Follow-up:  Vicki Alcocer MD  [address] [place] [address] [phone]    Call in 1 day            Medications Prescribed:  Current Discharge Medication List

## 2024-05-08 NOTE — ED INITIAL ASSESSMENT (HPI)
C/o dizzy/lightheaded mostly positional. Worse the past 2 weeks. Was sent over by dietician for IV fluids. States has eating disorder and restrict self from eating and drinking. Took only 6.5 ounces of juice today.

## 2024-05-09 LAB
ATRIAL RATE: 132 BPM
B-HCG UR QL: NEGATIVE
BUN BLD-MCNC: 9 MG/DL (ref 7–18)
CHLORIDE BLD-SCNC: 105 MMOL/L (ref 98–112)
CO2 BLD-SCNC: 24 MMOL/L (ref 21–32)
CREAT BLD-MCNC: 0.6 MG/DL
EGFRCR SERPLBLD CKD-EPI 2021: 131 ML/MIN/1.73M2 (ref 60–?)
GLUCOSE BLD-MCNC: 123 MG/DL (ref 70–99)
HCT VFR BLD CALC: 32 %
ISTAT IONIZED CALCIUM FOR CHEM 8: 1.2 MMOL/L (ref 1.12–1.32)
P AXIS: 26 DEGREES
P-R INTERVAL: 126 MS
POTASSIUM BLD-SCNC: 4 MMOL/L (ref 3.6–5.1)
Q-T INTERVAL: 298 MS
QRS DURATION: 70 MS
QTC CALCULATION (BEZET): 441 MS
R AXIS: 22 DEGREES
SODIUM BLD-SCNC: 139 MMOL/L (ref 136–145)
T AXIS: 18 DEGREES
VENTRICULAR RATE: 132 BPM

## 2024-06-06 ENCOUNTER — TELEMEDICINE (OUTPATIENT)
Dept: INTEGRATIVE MEDICINE | Facility: CLINIC | Age: 24
End: 2024-06-06
Payer: COMMERCIAL

## 2024-06-06 ENCOUNTER — MED REC SCAN ONLY (OUTPATIENT)
Dept: INTEGRATIVE MEDICINE | Facility: CLINIC | Age: 24
End: 2024-06-06

## 2024-06-06 DIAGNOSIS — R65.10 SYSTEMIC INFLAMMATORY RESPONSE SYNDROME (HCC): Primary | ICD-10-CM

## 2024-06-06 DIAGNOSIS — R53.82 CHRONIC FATIGUE: ICD-10-CM

## 2024-06-06 DIAGNOSIS — Z86.19 HISTORY OF MONONUCLEOSIS: ICD-10-CM

## 2024-06-06 DIAGNOSIS — R29.90 NEUROLOGICAL DYSFUNCTION: ICD-10-CM

## 2024-06-06 PROCEDURE — 99215 OFFICE O/P EST HI 40 MIN: CPT | Performed by: PHYSICIAN ASSISTANT

## 2024-06-06 PROCEDURE — G2211 COMPLEX E/M VISIT ADD ON: HCPCS | Performed by: PHYSICIAN ASSISTANT

## 2024-06-06 NOTE — PROGRESS NOTES
Called patient to verify lab location of where to send lab orders. No answer left VM to call back with info.
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
## 2024-07-02 ENCOUNTER — TELEMEDICINE (OUTPATIENT)
Dept: INTEGRATIVE MEDICINE | Facility: CLINIC | Age: 24
End: 2024-07-02
Payer: COMMERCIAL

## 2024-07-02 DIAGNOSIS — R53.82 CHRONIC FATIGUE: ICD-10-CM

## 2024-07-02 DIAGNOSIS — G90.1 DYSAUTONOMIA (HCC): ICD-10-CM

## 2024-07-02 DIAGNOSIS — R53.83 OTHER FATIGUE: ICD-10-CM

## 2024-07-02 DIAGNOSIS — D84.1 DISORDER OF COMPLEMENT (HCC): ICD-10-CM

## 2024-07-02 DIAGNOSIS — R65.10 SYSTEMIC INFLAMMATORY RESPONSE SYNDROME (HCC): Primary | ICD-10-CM

## 2024-07-02 DIAGNOSIS — R29.90 NEUROLOGICAL DYSFUNCTION: ICD-10-CM

## 2024-07-02 PROCEDURE — 99215 OFFICE O/P EST HI 40 MIN: CPT | Performed by: PHYSICIAN ASSISTANT

## 2024-07-02 PROCEDURE — G2211 COMPLEX E/M VISIT ADD ON: HCPCS | Performed by: PHYSICIAN ASSISTANT

## 2024-07-02 RX ORDER — METFORMIN HYDROCHLORIDE 500 MG/1
500 TABLET, EXTENDED RELEASE ORAL DAILY
Qty: 90 TABLET | Refills: 0 | Status: SHIPPED | OUTPATIENT
Start: 2024-07-02 | End: 2024-07-02

## 2024-07-02 RX ORDER — METFORMIN HYDROCHLORIDE 500 MG/1
500 TABLET, EXTENDED RELEASE ORAL DAILY
Qty: 90 TABLET | Refills: 0 | Status: SHIPPED | OUTPATIENT
Start: 2024-07-02 | End: 2024-09-30
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
## 2024-07-02 NOTE — PATIENT INSTRUCTIONS
Limit inflammatory foods   Gluten, corn and dairy     Metformin - Insulin balancing     For neuroinflammation   Rg3/NR nasal spray - compounding pharmacy - [facility]     Below to support mitochondria       Mitocore  Mitocore supplies key mitochondrial micronutrients and a smart combination of alpha lipoic acid, N-acetyl cysteine, and acetyl L-carnitine to boost cellular energy production.*  2-4 capsules per day or as recommended by your health care professional.    Serving Size: 4 Capsules    Amount Per Serving  Vitamin A (from 5,000 IU  as Natural Beta Carotene) 1,500 mcg  Vitamin C (as Ascorbic Acid USP) 250 mg  Vitamin D (D3 as Cholecalciferol) 25 mcg (1,000 IU)  Thiamin (Vitamin B1)  (from Thiamine Hydrochloride USP) 15 mg  Riboflavin (Vitamin B2 USP) 15 mg  Niacin (as Niacinamide USP) 15 mg  Vitamin B6  (as Pyridoxine Hydrochloride USP) 15 mg  Folate (from 800 mcg as  Quatrefolic® (6S)-5-Methyltetrahydrofolic acid glucosamine salt) 1,360 mcg DFE  Vitamin B12 (as Methylcobalamin) 250 mcg  Biotin 50 mcg  Pantothenic Acid  (as d-Calcium Pantothenate USP) 15 mg  Choline (as Choline Bitartrate) 15 mg  Calcium (as Calcium Citrate USP) 75 mg  Iodine (from Potassium Iodide) 37 mcg  Magnesium (as DiMagnesium Malate) 75 mg  Zinc (as TRAACS® Zinc  Bisglycinate Chelate) 5 mg  Selenium (as Selenium  Glycinate Complex) 75 mcg  Manganese (as TRAACS®  Manganese Bisglycinate Chelate) 1 mg  Chromium (as O-polynicotinate)‡ 50 mcg  Potassium (as Potassium Citrate USP) 30 mg  N-Acetyl-L-Cysteine  mg  Acetyl L-Carnitine Hydrochloride 500 mg  Malic Acid (as DiMagnesium Malate) 215 mg  Alpha Lipoic Acid 200 mg  Mixed Tocopherols 50 mg  Green Tea Leaf Extract (Standardized  to contain 45% EGCg (Epigallocatechin gallate)) 45 mg  Broccoli Seed Extract (TrueBroc®  )  (Standardized to contain 13% Sulforaphane Glucosinolate) 40 mg  Inositol NF 15 mg  trans -Resveratrol (from  Polygonum cuspidatum (Roots)) 10 mg    Moducare - sent  to [facility] - to help modulate immune system     The two below are to support cell structure    Body bio balance oil - sent to [facility]   Ps 100 sent to full [facility]     May consider binder and antimicrobial protocol

## 2024-07-14 ENCOUNTER — HOSPITAL ENCOUNTER (OUTPATIENT)
Age: 24
Discharge: HOME | End: 2024-07-14
Payer: COMMERCIAL

## 2024-07-14 VITALS
RESPIRATION RATE: 20 BRPM | TEMPERATURE: 98 F | OXYGEN SATURATION: 100 % | WEIGHT: 143.31 LBS | HEIGHT: 66 IN | HEART RATE: 140 BPM | BODY MASS INDEX: 23.03 KG/M2 | DIASTOLIC BLOOD PRESSURE: 90 MMHG | SYSTOLIC BLOOD PRESSURE: 145 MMHG

## 2024-07-14 DIAGNOSIS — H53.9 VISION CHANGES: Primary | ICD-10-CM

## 2024-07-14 DIAGNOSIS — H57.11 EYE PAIN, RIGHT: ICD-10-CM

## 2024-07-14 PROCEDURE — 99213 OFFICE O/P EST LOW 20 MIN: CPT

## 2024-07-14 RX ORDER — GABAPENTIN 400 MG/1
400 CAPSULE ORAL NIGHTLY
COMMUNITY

## 2024-07-14 RX ORDER — HYDROCODONE BITARTRATE AND ACETAMINOPHEN 5; 325 MG/1; MG/1
1 TABLET ORAL EVERY 6 HOURS PRN
COMMUNITY

## 2024-07-14 RX ORDER — GALCANEZUMAB 120 MG/ML
INJECTION, SOLUTION SUBCUTANEOUS
COMMUNITY

## 2024-07-14 NOTE — DISCHARGE INSTRUCTIONS
Follow-up with ophthalmology if pain continues.  Return for any curtain closing sensation in your vision, severe pain or any other concerns

## 2024-07-14 NOTE — ED PROVIDER NOTES
Patient Seen in: [facility]      History     Chief Complaint   Patient presents with    Eye Visual Problem     Stated Complaint: right eye pain    Subjective:   24-year-old female presents to the immediate care for right eye pain.  Patient states pain started on Friday with a pressure within her eye she states it turned into a migraine however this is atypical for her usual migraines.  Headache pain is gone but continues to have pain in her eye that she feels it is bulging.  Does report intermittently cloudy vision but denies any curtain closing sensation            Objective:   No pertinent past medical history.            No pertinent past surgical history.              No pertinent social history.            Review of Systems   Constitutional: Negative.    Eyes:  Positive for pain.   Respiratory: Negative.     Cardiovascular: Negative.    Gastrointestinal: Negative.    Skin: Negative.    Neurological: Negative.        Positive for stated Chief Complaint: Eye Visual Problem    Other systems are as noted in HPI.  Constitutional and vital signs reviewed.      All other systems reviewed and negative except as noted above.    Physical Exam     ED Triage Vitals [07/14/24 1419]   /90   Pulse (!) 140   Resp 20   Temp 97.6 °F (36.4 °C)   Temp src Temporal   SpO2 100 %   O2 Device None (Room air)       Current Vitals:   Vital Signs  BP: 145/90  Pulse: (!) 140  Resp: 20  Temp: 97.6 °F (36.4 °C)  Temp src: Temporal    Oxygen Therapy  SpO2: 100 %  O2 Device: None (Room air)        Right Eye Chart Acuity: 20/25 (-2 letters), Corrected  Left Eye Chart Acuity: 20/20, Corrected    Physical Exam  Vitals and nursing note reviewed.   Constitutional:       General: She is not in acute distress.  HENT:      Head: Normocephalic.   Cardiovascular:      Rate and Rhythm: Normal rate.   Pulmonary:      Effort: Pulmonary effort is normal.   Musculoskeletal:         General: Normal range of motion.   Skin:      General: Skin is warm and dry.   Neurological:      General: No focal deficit present.      Mental Status: She is alert and oriented to person, place, and time.               ED Course   Labs Reviewed - No data to display  Ocular pressures and fluorescein stain performed by my supervising physician                 MDM      Medical Decision Making  Pertinent Labs & Imaging studies reviewed. (See chart for details).  Patient coming in with right eye pain.   Differential diagnosis includes iritis, eye pain, corneal abrasion  Will treat for right eye.  Will discharge on supportive care, follow-up with primary care physician. Patient is comfortable with this plan.     Overall Pt looks good. Non-toxic, well-hydrated and in no respiratory distress. Vital signs are reassuring. Exam is reassuring. I do not believe pt requires and additional diagnostic studies or intervention. I believe pt can be discharged home to continue evaluation as an outpatient. Follow-up provider given. Discharge instructions given and reviewed. Return for any problems. All understand and agreewith the plan.     Please note that this report has been produced using speech recognition software and may contain errors related to that system including, but not limited to, errors in grammar, punctuation, and spelling, as well as words and phrases that possibly may have been recognized inappropriately. If there are any questions or concerns, contact the dictating provider for clarification.       The 21st Century Cures Act makes Medical Notes like these available to patients in the interest of transparency.  However, be advised this is a medical document.  It is intended as peer to peer communication.  It is written in medical language and may contain abbreviations or verbiage that are unfamiliar.  It may appear blunt or direct.  Medical documents are intended to carry relevant information, facts as evident, and the clinical opinion of the  practitioner      Problems Addressed:  Eye pain, right: acute illness or injury  Vision changes: acute illness or injury        Disposition and Plan     Clinical Impression:  1. Vision changes    2. Eye pain, right         Disposition:  Discharge  7/14/2024  3:03 pm    Follow-up:  Vicki Alcocer MD  [address] [place] [address]  [phone]          [address] [id number]  In 2 days            Medications Prescribed:  Discharge Medication List as of 7/14/2024  3:04 PM

## 2024-07-14 NOTE — ED INITIAL ASSESSMENT (HPI)
Right eye pain , states her eye is throbbing and feels like it is bulging , also c/o migraine that started after the eye pain. No injury. At times vision is cloudy

## 2024-08-15 ENCOUNTER — MED REC SCAN ONLY (OUTPATIENT)
Dept: INTEGRATIVE MEDICINE | Facility: CLINIC | Age: 24
End: 2024-08-15

## 2024-09-03 ENCOUNTER — TELEMEDICINE (OUTPATIENT)
Dept: INTEGRATIVE MEDICINE | Facility: CLINIC | Age: 24
End: 2024-09-03
Payer: COMMERCIAL

## 2024-09-03 DIAGNOSIS — R65.10 SYSTEMIC INFLAMMATORY RESPONSE SYNDROME (HCC): ICD-10-CM

## 2024-09-03 DIAGNOSIS — G90.1 DYSAUTONOMIA (HCC): ICD-10-CM

## 2024-09-03 DIAGNOSIS — R53.83 OTHER FATIGUE: ICD-10-CM

## 2024-09-03 DIAGNOSIS — E88.819 INSULIN RESISTANCE: Primary | ICD-10-CM

## 2024-09-03 DIAGNOSIS — R53.82 CHRONIC FATIGUE: ICD-10-CM

## 2024-09-03 DIAGNOSIS — R29.90 NEUROLOGICAL DYSFUNCTION: ICD-10-CM

## 2024-09-03 PROCEDURE — G2211 COMPLEX E/M VISIT ADD ON: HCPCS | Performed by: PHYSICIAN ASSISTANT

## 2024-09-03 PROCEDURE — 99215 OFFICE O/P EST HI 40 MIN: CPT | Performed by: PHYSICIAN ASSISTANT
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
## 2024-09-03 NOTE — PATIENT INSTRUCTIONS
If depression worsens off nasal spray we will add NR and panax ginsing in oral form.     Stop moducare   Stop balance oil  Continue phosphatidyl serine   Nasal spray stay off for 1.5 weeks    Mitocore - 2 tabs a day     We need to monitor migraines and pain   I have concerns about histamines. If we can lower histamine I diet we should see improvement in dysautonomia     GI Hist Support  [employer]    G.I. Hist Support is a formula designed to assist patients who have trouble processing external sources of histamine-rich foods and nutritional elements. This formula is especially helpful for those with genetic weaknesses in A01C (Diamine Oxidase), the main enzyme responsible for the degradation of ingested histamine.*    A key active ingredient in G.I. Hist Support is porcine-derived diamine oxidase (JACKLYN), and research suggests that JACKLYN derived from porcine kidney appears to have identical action to JACKLYN derived from porcine intestine. Along with 5 other ingredients shown to support allergy relief, GI Hist is one of the best supplements on the market that includes key cofactors along with high dose JACKLYN.*    Each ingredient is clinically tested to assist with histamine degradation and the oxidative stress created by histamines.*  Adults take 1-2 capsules no more than 15 minutes before the consumption of histamine-rich foods. Children take one capsule no more than 15 minutes before the consumption of histamine-rich foods or take as directed by your healthcare practitioner.    As a daily maintenance support product, take 2 capsules in AM and 2 capsules in PM. For 30 days   Then we will use it for histamine meals       Serving Size: 2 Vegetable Capsules     Amount Per Serving  Vitamin C ... 200mg  (Ascorbic Acid)  Quercetin ... 100mg  Porcine Kidney ... 300mg  Alpha Lipoic Acid ... 100mg  Stinging Nettle Root 4:1 Extract ... 50mg  (Urtica dioica) (Equivalent to 200mg of Root)  Bromelain ... 100mg  (7 GDU)    Other  Ingredients: Hypromellose (capsule), Ascorbyl Palmitate, Silicon Dioxide, Microcrystalline Cellulose  Caution: Consult your healthcare practitioner before use, especially if you are pregnant or nursing. Keep out of reach of children. Avoid if allergic to pork or any other ingredient. This formula is NOT EFFECTIVE for symptoms of immune-related food allergies, such as peanuts, shellfish, etc, or for gluten intolerance due to sensitivity or celiac disease.    Keep lid tightly closed and in cool, dry area.    Histamine food list   In general, foods to AVOID:   Cultured or fermented foods - sauerkraut, kombucha, pickles, miso, kimchee  Yeast  Vinegar and foods containing vinegar - salad dressing, mustard, ketchup, mayonnaise  Chocolate and Cocoa  Soda and energy drinks  Canned foods  Preservatives and additives  Leftovers: very ripe, older or non-hygenic foods  Coffee, black and green tea  Spices/seasoning - anise, cinnamon, clove, nutmeg, chili powder, wells, hot peppers       Food group Low histamine Minimal High histamine    Protein  Poultry - chicken, duck, pheasant, turkey  -Organic, grass fed and skinless preferred  Frozen     Meat: beef, buffalo, elk, lamb, pork, venison  -Organic, grass fed preferred   frozen -Slow-cooked or leftover meat  -Processed meats: benz, sausage, deli meat, etc.  -Smoked or cured: ham, salami, pastrami -Factory-farmed, or with added sugar, MSG, sulfites, or carrageenan    Seafood: sustainable fished and wild caught   -gutted within 30 minutes  Flash-frozen depending upon how quickly fish was gutted -Shellfish  -aged fish (canned, smoked)  -fish not immediately gutted after catching   Eggs Eggs organic & free-range; fully cooked  Egg whites, raw or undercooked   Dairy  Milk  Yogurt    Rice Milk  Kefir- depending on culture used     Cream      Cream Cheese (except when cultured  Aged cheese     Cottage cheese     Fruit - Fresh  Apples (all varieties) Apricots   Blackberries, Blueberries    Cherries  Dates   Figs   Exotic fruits (star fruit, quince)   Grapes (both red & green)   Melon   Nectarines   Peaches   Pears (all varieties) Plums   Pomegranates Raspberries Watermelon   Non-citrus juices Dried Fruit Generally, any overripe fruit   Avocado  Bananas   Citrus fruits   Grapefruit  Kiwi  Lemon/Lime  Chandana   Oranges  Papaya  Pineapple Strawberries  Tangerines     Vegetables  Anise/fennel root Artichoke   Arugula  Asparagus   Beets  Euceda peppers   Bok [place]   Broccoli   [place] sprouts Cabbage   Carrots  Cauliflower  Celery   Cucumber   Eggplant   Garlic   Leila   Green beans  Greens (beet, epi, mustard, turnip) Herbs (leafy: basil, mint, parsley, oregano, rosemary, tarragon, thyme) Jicama   Kale   Kohlrabi   Leeks  Lettuce (david, butter, red)   Mushrooms (all)   Okra   Onion (red)/Shallot Parsnips   Peas (snow, sugar snap)   Pumpkin   Radish   [place]   Rhubarb   Sprouts   Squash (acorn, butternut, delicata, spaghetti, summer) Sweet Potato/Yam Swiss chard Turnip Watercress   Zucchini  Spinach   Tomato (fresh or processed)   Grains  [place]  Oats  Rice  Spelt  Pasta made from corn, rice, spelt  Wheat-based foods   Breads  Yeast-free: muffins & tortillas made from acceptable grains Euro-style rye bread     Sweeteners Agave Honey Maple syrup Non-GMO sugar     Beans Garbanzo  Black  Lentils   Red beans    Fats  - must be 100% grass-fed & organic Cooking Fats:  - Animal fats   -Clarified butter   -Ghee  -Coconut oil  -Extra-virgin Olive oil Eating Fats:   - Coconut   -butter  -Coconut flakes -Olives (all) Nuts & Seeds: Almonds   [place]hu[place]ana butter  Brazil nuts   Pecans   Pistachios   -Flax   -Pine nuts   -Pumpkin seeds/ pepitas   -Sesame seeds   -Sunflower seeds   -Sunflower seed butter   -Walnuts Cashews  Coconut milk (canned)  Hazelnuts (Filberts) Macadamia nuts  Avocado   Drinks Herbal Tea   Non citrus fruit juice  water   Green tea   Clear spirits   White wine  Coffee  Black Tea  Soda  Wine   Beer

## 2024-09-05 ENCOUNTER — PATIENT MESSAGE (OUTPATIENT)
Dept: INTEGRATIVE MEDICINE | Facility: CLINIC | Age: 24
End: 2024-09-05

## 2024-09-06 ENCOUNTER — NURSE TRIAGE (OUTPATIENT)
Dept: FAMILY MEDICINE CLINIC | Facility: CLINIC | Age: 24
End: 2024-09-06

## 2024-09-06 NOTE — TELEPHONE ENCOUNTER
Message sent to pt. See other encounter.  
RN s/w patient    Pt stated her depression has gotten worse since being off the nasal spray for a couple weeks and would like to try the pill form of the nasal spray. ADLs are not affected.    Pt denies thoughts of harming self or others.     ED precautions given to pt. Pt verbalized understanding.    Will forward to Cassi for review.      Reason for Disposition   New or changed psychiatric medications > 2 weeks ago and not feeling any better    Protocols used: Depression-A-OH    
Yes

## 2024-09-06 NOTE — TELEPHONE ENCOUNTER
From: Vicki Chavez  To: Cassi Quinn  Sent: 9/5/2024 11:05 PM CDT  Subject: Restarting the Synapsin with Methylcobalmin    Alfonzo Ye!  I’m already noticing my depression worsening, unfortunately. I was wondering if we could try the pill form of the nasal spray?  Please let me know,  Thank you!

## 2024-09-24 NOTE — TELEPHONE ENCOUNTER
A refill request was received for:  Requested Prescriptions     Pending Prescriptions Disp Refills    metFORMIN  MG Oral Tablet 24 Hr 90 tablet 0     Sig: Take 1 tablet (500 mg total) by mouth daily.     Last refill date:  7/2/24   Qty: 90 and 0   Last office visit:  9/3/24     Future Appointments   Date Time Provider Department Center   10/15/2024  2:45 PM Cassi Quinn, [facility] [place]

## 2024-09-25 RX ORDER — METFORMIN HCL 500 MG
500 TABLET, EXTENDED RELEASE 24 HR ORAL DAILY
Qty: 90 TABLET | Refills: 0 | Status: SHIPPED | OUTPATIENT
Start: 2024-09-25 | End: 2024-12-24

## 2024-09-30 ENCOUNTER — LAB ENCOUNTER (OUTPATIENT)
Dept: LAB | Age: 24
End: 2024-09-30
Attending: PHYSICIAN ASSISTANT
Payer: COMMERCIAL

## 2024-09-30 DIAGNOSIS — E88.819 INSULIN RESISTANCE: ICD-10-CM

## 2024-09-30 LAB
ALBUMIN SERPL-MCNC: 4.4 G/DL (ref 3.2–4.8)
ALBUMIN/GLOB SERPL: 1.4 {RATIO} (ref 1–2)
ALP LIVER SERPL-CCNC: 96 U/L
ALT SERPL-CCNC: 40 U/L
ANION GAP SERPL CALC-SCNC: 5 MMOL/L (ref 0–18)
AST SERPL-CCNC: 17 U/L (ref ?–34)
BILIRUB SERPL-MCNC: 0.2 MG/DL (ref 0.3–1.2)
BUN BLD-MCNC: 10 MG/DL (ref 9–23)
CALCIUM BLD-MCNC: 10.2 MG/DL (ref 8.7–10.4)
CHLORIDE SERPL-SCNC: 105 MMOL/L (ref 98–112)
CO2 SERPL-SCNC: 27 MMOL/L (ref 21–32)
CREAT BLD-MCNC: 0.85 MG/DL
EGFRCR SERPLBLD CKD-EPI 2021: 99 ML/MIN/1.73M2 (ref 60–?)
EST. AVERAGE GLUCOSE BLD GHB EST-MCNC: 131 MG/DL (ref 68–126)
FASTING STATUS PATIENT QL REPORTED: YES
GLOBULIN PLAS-MCNC: 3.2 G/DL (ref 2–3.5)
GLUCOSE BLD-MCNC: 76 MG/DL (ref 70–99)
HBA1C MFR BLD: 6.2 % (ref ?–5.7)
INSULIN SERPL-ACNC: 23.5 MU/L (ref 3–25)
OSMOLALITY SERPL CALC.SUM OF ELEC: 282 MOSM/KG (ref 275–295)
POTASSIUM SERPL-SCNC: 4.3 MMOL/L (ref 3.5–5.1)
PROT SERPL-MCNC: 7.6 G/DL (ref 5.7–8.2)
SODIUM SERPL-SCNC: 137 MMOL/L (ref 136–145)

## 2024-09-30 PROCEDURE — 83525 ASSAY OF INSULIN: CPT

## 2024-09-30 PROCEDURE — 80053 COMPREHEN METABOLIC PANEL: CPT

## 2024-09-30 PROCEDURE — 36415 COLL VENOUS BLD VENIPUNCTURE: CPT

## 2024-09-30 PROCEDURE — 83036 HEMOGLOBIN GLYCOSYLATED A1C: CPT

## 2024-10-15 ENCOUNTER — TELEMEDICINE (OUTPATIENT)
Dept: INTEGRATIVE MEDICINE | Facility: CLINIC | Age: 24
End: 2024-10-15
Payer: COMMERCIAL

## 2024-10-15 DIAGNOSIS — Z86.19 HISTORY OF MONONUCLEOSIS: ICD-10-CM

## 2024-10-15 DIAGNOSIS — D84.1 DISORDER OF COMPLEMENT (HCC): ICD-10-CM

## 2024-10-15 DIAGNOSIS — R53.82 CHRONIC FATIGUE: ICD-10-CM

## 2024-10-15 DIAGNOSIS — G90.1 DYSAUTONOMIA (HCC): Primary | ICD-10-CM

## 2024-10-15 DIAGNOSIS — R29.90 NEUROLOGICAL DYSFUNCTION: ICD-10-CM

## 2024-10-15 DIAGNOSIS — R53.83 OTHER FATIGUE: ICD-10-CM

## 2024-10-15 DIAGNOSIS — E88.819 INSULIN RESISTANCE: ICD-10-CM

## 2024-10-15 DIAGNOSIS — R65.10 SYSTEMIC INFLAMMATORY RESPONSE SYNDROME (HCC): ICD-10-CM

## 2024-10-15 PROCEDURE — G2211 COMPLEX E/M VISIT ADD ON: HCPCS | Performed by: PHYSICIAN ASSISTANT

## 2024-10-15 PROCEDURE — 99417 PROLNG OP E/M EACH 15 MIN: CPT | Performed by: PHYSICIAN ASSISTANT

## 2024-10-15 PROCEDURE — 99215 OFFICE O/P EST HI 40 MIN: CPT | Performed by: PHYSICIAN ASSISTANT

## 2024-10-15 RX ORDER — METFORMIN HYDROCHLORIDE 750 MG/1
750 TABLET, EXTENDED RELEASE ORAL DAILY
Qty: 30 TABLET | Refills: 0 | Status: SHIPPED | OUTPATIENT
Start: 2024-10-15

## 2024-10-15 NOTE — PATIENT INSTRUCTIONS
plan  1) Get the two test done at [facility] on [address]   2) Add methylated b complex   3) add an amino acid support   4) continue panax ginsing, NR and mitocore   5) continue GI histamine support - as needed with high histamine meals. Go back on maintenance if your dysautonomia worsens   6) after the two labs we will look at what type of detox/removal process   7) future we need to look at nasal swab to address MarCans   8) focus on protein     Supplement recommendations:  Methyl B Complex (60 capsules) ([employer]): Please take  1 Vegetable Capsule x once per day / anytime.   Amino Acid Supreme Powder (360 Grams) ([employer]): Please take  1 scoop x once per day / anytime  ongoing
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
## 2024-11-16 DIAGNOSIS — R65.10 SYSTEMIC INFLAMMATORY RESPONSE SYNDROME (HCC): ICD-10-CM

## 2024-11-16 DIAGNOSIS — Z86.19 HISTORY OF MONONUCLEOSIS: ICD-10-CM

## 2024-11-16 DIAGNOSIS — G90.1 DYSAUTONOMIA (HCC): ICD-10-CM

## 2024-11-16 DIAGNOSIS — E88.819 INSULIN RESISTANCE: ICD-10-CM

## 2024-11-16 DIAGNOSIS — R53.82 CHRONIC FATIGUE: ICD-10-CM

## 2024-11-16 DIAGNOSIS — R29.90 NEUROLOGICAL DYSFUNCTION: ICD-10-CM

## 2024-11-16 DIAGNOSIS — D84.1 DISORDER OF COMPLEMENT (HCC): ICD-10-CM

## 2024-11-18 NOTE — TELEPHONE ENCOUNTER
A refill request was received for:  Requested Prescriptions     Pending Prescriptions Disp Refills    metFORMIN  MG Oral Tablet 24 Hr 30 tablet 0     Sig: Take 1 tablet (750 mg total) by mouth daily.     Last refill date:  10/15/24   Qty: 30 and 0   Last office visit: 10/15/24       Future Appointments   Date Time Provider Department Center   12/4/2024  5:15 PM Cassi Quinn, [facility] [place]

## 2024-11-19 RX ORDER — METFORMIN HYDROCHLORIDE 750 MG/1
750 TABLET, EXTENDED RELEASE ORAL DAILY
Qty: 30 TABLET | Refills: 0 | Status: SHIPPED | OUTPATIENT
Start: 2024-11-19

## 2024-11-21 LAB
41 KD (IGG) BAND: REACTIVE
EBV EARLY ANTIGEN D AB (IGG): <9 U/ML
LYME DISEASE AB (IGG) WB: NEGATIVE
LYME DISEASE AB (IGM) WB: NEGATIVE

## 2024-12-04 ENCOUNTER — TELEMEDICINE (OUTPATIENT)
Dept: INTEGRATIVE MEDICINE | Facility: CLINIC | Age: 24
End: 2024-12-04
Payer: COMMERCIAL

## 2024-12-04 DIAGNOSIS — E88.819 INSULIN RESISTANCE: Primary | ICD-10-CM

## 2024-12-04 DIAGNOSIS — R29.90 NEUROLOGICAL DYSFUNCTION: ICD-10-CM

## 2024-12-04 DIAGNOSIS — R65.10 SYSTEMIC INFLAMMATORY RESPONSE SYNDROME (HCC): ICD-10-CM

## 2024-12-04 DIAGNOSIS — R53.82 CHRONIC FATIGUE: ICD-10-CM

## 2024-12-04 DIAGNOSIS — D84.1 DISORDER OF COMPLEMENT (HCC): ICD-10-CM

## 2024-12-04 DIAGNOSIS — R53.83 OTHER FATIGUE: ICD-10-CM

## 2024-12-04 DIAGNOSIS — Z86.19 HISTORY OF MONONUCLEOSIS: ICD-10-CM

## 2024-12-04 DIAGNOSIS — G90.1 DYSAUTONOMIA (HCC): ICD-10-CM
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
## 2024-12-05 NOTE — PATIENT INSTRUCTIONS
Plan   1) Future - consider mycotoxin testing  2) January after autonomic testing start mycobind    MycoBind  Metabolic Code    Mycobind is specifically used in Chronic Inflammatory Response Syndrome (CIRS) and biotoxin illnesses. Mycobind contains dietary supplements including okra and beet juice extract. Okra (Abelmoschus esculentus) fruit (4:1 extract) helps to remove inflammatory cytokines and compounds that are produced in CIRS.* Beet root juice extract helps with nitric oxide production and liver detoxification.* MycoBind supports TRIAD 2 immune issues and TRIAD 4 detoxification issues.*  Suggested Use:  As a Nutritional Supplement, take one scoop daily, with food, or as directed by a Health Care Professional.      Serving Size: One Scoop (0.7 g)    Amount Per Serving  Okra Extract 4:1 … 400mg  (Abelmoschus esculentus) (fruit)  Beet Root Juice … 300mg  (Beta vulgaris) (organic)    Other Ingredients: Organic rice extract blend    Does not contain: Soy, yeast, sugar, gluten, artificial colors, flavors or preservatives      Start with 1/4 scoop every other day.   If you develop symptoms of worsening dysautonomia, migraines, sensitivities decrease to every 3rd day.     3) amenorrhea   Discuss with Dr. Huerta if there is any concern with uterine wall thickening. If there is need to shed the lining birth control is a necessary step  If there is no concern at this time you could discuss waiting on starting birth control. Your DHEA is low. This can be a symptoms of chronic systemic inflammation. When MSH is low which yours is your DHEA can be decreased. By working on bringing down inflammation and supporting the removal of toxins we should see you slowly be less sensitive to your environment, less episodes of dysautonomia and hormone levels improve.     4) utilize gi hist support at 2 tabs twice a day. We will discuss pulling it back as you are less sensitive.

## 2024-12-16 DIAGNOSIS — R29.90 NEUROLOGICAL DYSFUNCTION: ICD-10-CM

## 2024-12-16 DIAGNOSIS — R65.10 SYSTEMIC INFLAMMATORY RESPONSE SYNDROME (HCC): ICD-10-CM

## 2024-12-16 DIAGNOSIS — E88.819 INSULIN RESISTANCE: ICD-10-CM

## 2024-12-16 DIAGNOSIS — R53.82 CHRONIC FATIGUE: ICD-10-CM

## 2024-12-16 DIAGNOSIS — G90.1 DYSAUTONOMIA (HCC): ICD-10-CM

## 2024-12-16 DIAGNOSIS — D84.1 DISORDER OF COMPLEMENT (HCC): ICD-10-CM

## 2024-12-16 DIAGNOSIS — Z86.19 HISTORY OF MONONUCLEOSIS: ICD-10-CM

## 2024-12-17 NOTE — TELEPHONE ENCOUNTER
A refill request was received for:  Requested Prescriptions     Pending Prescriptions Disp Refills    metFORMIN  MG Oral Tablet 24 Hr 30 tablet 0     Sig: Take 1 tablet (750 mg total) by mouth daily.     Last refill date:  11/19/2024  Qty: 30 tablets and 0  Dx: insulin resistance  Last office visit: 12/4/2024  When is follow up due: February 2025    No future appointments.

## 2024-12-18 RX ORDER — METFORMIN HYDROCHLORIDE 750 MG/1
750 TABLET, EXTENDED RELEASE ORAL DAILY
Qty: 30 TABLET | Refills: 0 | Status: SHIPPED | OUTPATIENT
Start: 2024-12-18

## 2025-01-16 DIAGNOSIS — D84.1 DISORDER OF COMPLEMENT (HCC): ICD-10-CM

## 2025-01-16 DIAGNOSIS — G90.1 DYSAUTONOMIA (HCC): ICD-10-CM

## 2025-01-16 DIAGNOSIS — E88.819 INSULIN RESISTANCE: ICD-10-CM

## 2025-01-16 DIAGNOSIS — R29.90 NEUROLOGICAL DYSFUNCTION: ICD-10-CM

## 2025-01-16 DIAGNOSIS — R65.10 SYSTEMIC INFLAMMATORY RESPONSE SYNDROME (HCC): ICD-10-CM

## 2025-01-16 DIAGNOSIS — Z86.19 HISTORY OF MONONUCLEOSIS: ICD-10-CM

## 2025-01-16 DIAGNOSIS — R53.82 CHRONIC FATIGUE: ICD-10-CM

## 2025-01-17 RX ORDER — METFORMIN HYDROCHLORIDE 750 MG/1
750 TABLET, EXTENDED RELEASE ORAL DAILY
Qty: 30 TABLET | Refills: 0 | Status: SHIPPED | OUTPATIENT
Start: 2025-01-17

## 2025-01-17 NOTE — TELEPHONE ENCOUNTER
A refill request was received for:  Requested Prescriptions     Pending Prescriptions Disp Refills    metFORMIN  MG Oral Tablet 24 Hr 30 tablet 0     Sig: Take 1 tablet (750 mg total) by mouth daily.     Last refill date:  12/18/2024  Qty: 30 tablets and 0  Dx: insulin resistance  Last office visit: 12/4/2024  When is follow up due: February 2025      Future Appointments   Date Time Provider Department Center   3/18/2025  4:15 PM Cassi Quinn, [facility] [place]

## 2025-02-03 ENCOUNTER — HOSPITAL ENCOUNTER (OUTPATIENT)
Dept: CT IMAGING | Age: 25
Discharge: HOME | End: 2025-02-03
Attending: UROLOGY
Payer: COMMERCIAL

## 2025-02-03 DIAGNOSIS — R31.9 HEMATURIA: ICD-10-CM

## 2025-02-03 PROCEDURE — 74178 CT ABD&PLV WO CNTR FLWD CNTR: CPT | Performed by: UROLOGY

## 2025-02-13 DIAGNOSIS — E88.819 INSULIN RESISTANCE: ICD-10-CM

## 2025-02-13 DIAGNOSIS — R65.10 SYSTEMIC INFLAMMATORY RESPONSE SYNDROME (HCC): ICD-10-CM

## 2025-02-13 DIAGNOSIS — D84.1 DISORDER OF COMPLEMENT (HCC): ICD-10-CM

## 2025-02-13 DIAGNOSIS — Z86.19 HISTORY OF MONONUCLEOSIS: ICD-10-CM

## 2025-02-13 DIAGNOSIS — R29.90 NEUROLOGICAL DYSFUNCTION: ICD-10-CM

## 2025-02-13 DIAGNOSIS — R53.82 CHRONIC FATIGUE: ICD-10-CM

## 2025-02-13 DIAGNOSIS — G90.1 DYSAUTONOMIA (HCC): ICD-10-CM

## 2025-02-14 RX ORDER — METFORMIN HYDROCHLORIDE 750 MG/1
750 TABLET, EXTENDED RELEASE ORAL DAILY
Qty: 30 TABLET | Refills: 0 | Status: SHIPPED | OUTPATIENT
Start: 2025-02-14

## 2025-02-14 NOTE — TELEPHONE ENCOUNTER
A refill request was received for:  Requested Prescriptions     Pending Prescriptions Disp Refills    metFORMIN  MG Oral Tablet 24 Hr 30 tablet 0     Sig: Take 1 tablet (750 mg total) by mouth daily.     Last refill date:  1/17/2025  Qty: 30 tablets and 0  Dx: Insulin resistance  Last office visit: 10/4/2024  When is follow up due: February 2025      Future Appointments   Date Time Provider Department Center   3/18/2025  4:15 PM Cassi Quinn, [facility] [place]

## 2025-02-17 ENCOUNTER — APPOINTMENT (OUTPATIENT)
Dept: CT IMAGING | Age: 25
End: 2025-02-17
Attending: EMERGENCY MEDICINE
Payer: COMMERCIAL

## 2025-02-17 ENCOUNTER — HOSPITAL ENCOUNTER (EMERGENCY)
Age: 25
Discharge: HOME | End: 2025-02-17
Attending: EMERGENCY MEDICINE
Payer: COMMERCIAL

## 2025-02-17 VITALS
TEMPERATURE: 99 F | BODY MASS INDEX: 23 KG/M2 | DIASTOLIC BLOOD PRESSURE: 83 MMHG | HEIGHT: 66 IN | OXYGEN SATURATION: 100 % | RESPIRATION RATE: 20 BRPM | SYSTOLIC BLOOD PRESSURE: 130 MMHG | HEART RATE: 121 BPM

## 2025-02-17 DIAGNOSIS — R10.9 LEFT FLANK PAIN: Primary | ICD-10-CM

## 2025-02-17 DIAGNOSIS — N20.0 KIDNEY STONE: ICD-10-CM

## 2025-02-17 DIAGNOSIS — Z87.442 HISTORY OF KIDNEY STONES: ICD-10-CM

## 2025-02-17 DIAGNOSIS — R31.9 HEMATURIA, UNSPECIFIED TYPE: ICD-10-CM

## 2025-02-17 LAB
ALBUMIN SERPL-MCNC: 4.7 G/DL (ref 3.2–4.8)
ALBUMIN/GLOB SERPL: 1.3 {RATIO} (ref 1–2)
ALP LIVER SERPL-CCNC: 92 U/L
ALT SERPL-CCNC: 35 U/L
ANION GAP SERPL CALC-SCNC: 9 MMOL/L (ref 0–18)
AST SERPL-CCNC: 22 U/L (ref ?–34)
B-HCG UR QL: NEGATIVE
BASOPHILS # BLD AUTO: 0.05 X10(3) UL (ref 0–0.2)
BASOPHILS NFR BLD AUTO: 0.3 %
BILIRUB SERPL-MCNC: <0.2 MG/DL (ref 0.3–1.2)
BILIRUB UR QL STRIP.AUTO: NEGATIVE
BUN BLD-MCNC: 10 MG/DL (ref 9–23)
CALCIUM BLD-MCNC: 9.9 MG/DL (ref 8.7–10.6)
CHLORIDE SERPL-SCNC: 106 MMOL/L (ref 98–112)
CO2 SERPL-SCNC: 22 MMOL/L (ref 21–32)
COLOR UR AUTO: YELLOW
CREAT BLD-MCNC: 1.14 MG/DL
EGFRCR SERPLBLD CKD-EPI 2021: 70 ML/MIN/1.73M2 (ref 60–?)
EOSINOPHIL # BLD AUTO: 0.16 X10(3) UL (ref 0–0.7)
EOSINOPHIL NFR BLD AUTO: 1.1 %
ERYTHROCYTE [DISTWIDTH] IN BLOOD BY AUTOMATED COUNT: 19.1 %
GLOBULIN PLAS-MCNC: 3.5 G/DL (ref 2–3.5)
GLUCOSE BLD-MCNC: 107 MG/DL (ref 70–99)
GLUCOSE UR STRIP.AUTO-MCNC: NEGATIVE MG/DL
HCT VFR BLD AUTO: 31.4 %
HGB BLD-MCNC: 9.7 G/DL
IMM GRANULOCYTES # BLD AUTO: 0.06 X10(3) UL (ref 0–1)
IMM GRANULOCYTES NFR BLD: 0.4 %
LEUKOCYTE ESTERASE UR QL STRIP.AUTO: NEGATIVE
LYMPHOCYTES # BLD AUTO: 2.16 X10(3) UL (ref 1–4)
LYMPHOCYTES NFR BLD AUTO: 14.3 %
MCH RBC QN AUTO: 23.7 PG (ref 26–34)
MCHC RBC AUTO-ENTMCNC: 30.9 G/DL (ref 31–37)
MCV RBC AUTO: 76.6 FL
MONOCYTES # BLD AUTO: 0.97 X10(3) UL (ref 0.1–1)
MONOCYTES NFR BLD AUTO: 6.4 %
NEUTROPHILS # BLD AUTO: 11.67 X10 (3) UL (ref 1.5–7.7)
NEUTROPHILS # BLD AUTO: 11.67 X10(3) UL (ref 1.5–7.7)
NEUTROPHILS NFR BLD AUTO: 77.5 %
NITRITE UR QL STRIP.AUTO: NEGATIVE
OSMOLALITY SERPL CALC.SUM OF ELEC: 284 MOSM/KG (ref 275–295)
PH UR STRIP.AUTO: 7 [PH] (ref 5–8)
PLATELET # BLD AUTO: 460 10(3)UL (ref 150–450)
POTASSIUM SERPL-SCNC: 4.3 MMOL/L (ref 3.5–5.1)
PROT SERPL-MCNC: 8.2 G/DL (ref 5.7–8.2)
RBC # BLD AUTO: 4.1 X10(6)UL
RBC #/AREA URNS AUTO: >10 /HPF
SODIUM SERPL-SCNC: 137 MMOL/L (ref 136–145)
SP GR UR STRIP.AUTO: >=1.03 (ref 1–1.03)
UROBILINOGEN UR STRIP.AUTO-MCNC: 0.2 MG/DL
WBC # BLD AUTO: 15.1 X10(3) UL (ref 4–11)

## 2025-02-17 PROCEDURE — 96374 THER/PROPH/DIAG INJ IV PUSH: CPT

## 2025-02-17 PROCEDURE — 81001 URINALYSIS AUTO W/SCOPE: CPT

## 2025-02-17 PROCEDURE — 85025 COMPLETE CBC W/AUTO DIFF WBC: CPT | Performed by: EMERGENCY MEDICINE

## 2025-02-17 PROCEDURE — 80053 COMPREHEN METABOLIC PANEL: CPT | Performed by: EMERGENCY MEDICINE

## 2025-02-17 PROCEDURE — 99284 EMERGENCY DEPT VISIT MOD MDM: CPT

## 2025-02-17 PROCEDURE — 81025 URINE PREGNANCY TEST: CPT

## 2025-02-17 PROCEDURE — 81015 MICROSCOPIC EXAM OF URINE: CPT

## 2025-02-17 PROCEDURE — 99285 EMERGENCY DEPT VISIT HI MDM: CPT

## 2025-02-17 PROCEDURE — 96375 TX/PRO/DX INJ NEW DRUG ADDON: CPT

## 2025-02-17 PROCEDURE — 74176 CT ABD & PELVIS W/O CONTRAST: CPT | Performed by: EMERGENCY MEDICINE

## 2025-02-17 PROCEDURE — 81001 URINALYSIS AUTO W/SCOPE: CPT | Performed by: EMERGENCY MEDICINE

## 2025-02-17 RX ORDER — ALPRAZOLAM 0.5 MG
0.5 TABLET ORAL DAILY PRN
COMMUNITY
Start: 2025-01-23

## 2025-02-17 RX ORDER — TAMSULOSIN HYDROCHLORIDE 0.4 MG/1
0.4 CAPSULE ORAL ONCE
Status: COMPLETED | OUTPATIENT
Start: 2025-02-17 | End: 2025-02-17

## 2025-02-17 RX ORDER — TAMSULOSIN HYDROCHLORIDE 0.4 MG/1
0.4 CAPSULE ORAL DAILY
Qty: 10 CAPSULE | Refills: 0 | Status: SHIPPED | OUTPATIENT
Start: 2025-02-17

## 2025-02-17 RX ORDER — HYDROMORPHONE HYDROCHLORIDE 1 MG/ML
1 INJECTION, SOLUTION INTRAMUSCULAR; INTRAVENOUS; SUBCUTANEOUS ONCE
Status: COMPLETED | OUTPATIENT
Start: 2025-02-17 | End: 2025-02-17

## 2025-02-17 RX ORDER — ONDANSETRON 4 MG/1
4 TABLET, ORALLY DISINTEGRATING ORAL EVERY 8 HOURS PRN
Qty: 10 TABLET | Refills: 0 | Status: SHIPPED | OUTPATIENT
Start: 2025-02-17 | End: 2025-02-27

## 2025-02-17 RX ORDER — HYDROCODONE BITARTRATE AND ACETAMINOPHEN 5; 325 MG/1; MG/1
1 TABLET ORAL EVERY 6 HOURS PRN
Qty: 20 TABLET | Refills: 0 | Status: SHIPPED | OUTPATIENT
Start: 2025-02-17

## 2025-02-17 RX ORDER — IVABRADINE 5 MG/1
5 TABLET, FILM COATED ORAL 2 TIMES DAILY
COMMUNITY
Start: 2025-01-29

## 2025-02-17 RX ORDER — ONDANSETRON 2 MG/ML
4 INJECTION INTRAMUSCULAR; INTRAVENOUS ONCE
Status: COMPLETED | OUTPATIENT
Start: 2025-02-17 | End: 2025-02-17

## 2025-02-17 RX ORDER — DIPHENHYDRAMINE HYDROCHLORIDE 50 MG/ML
50 INJECTION INTRAMUSCULAR; INTRAVENOUS ONCE
Status: COMPLETED | OUTPATIENT
Start: 2025-02-17 | End: 2025-02-17

## 2025-02-17 RX ORDER — CARVEDILOL 6.25 MG/1
12.5 TABLET ORAL DAILY
COMMUNITY
Start: 2025-01-08

## 2025-02-17 RX ORDER — GABAPENTIN 300 MG/1
300 CAPSULE ORAL EVERY MORNING
Status: ON HOLD | COMMUNITY
End: 2025-02-20

## 2025-02-17 RX ORDER — CARVEDILOL 6.25 MG/1
6.25 TABLET ORAL NIGHTLY
COMMUNITY
Start: 2025-01-08

## 2025-02-17 RX ORDER — METOCLOPRAMIDE HYDROCHLORIDE 5 MG/ML
10 INJECTION INTRAMUSCULAR; INTRAVENOUS ONCE
Status: COMPLETED | OUTPATIENT
Start: 2025-02-17 | End: 2025-02-17

## 2025-02-17 RX ORDER — KETOROLAC TROMETHAMINE 15 MG/ML
15 INJECTION, SOLUTION INTRAMUSCULAR; INTRAVENOUS ONCE
Status: COMPLETED | OUTPATIENT
Start: 2025-02-17 | End: 2025-02-17

## 2025-02-17 RX ORDER — IBUPROFEN 600 MG/1
600 TABLET, FILM COATED ORAL EVERY 8 HOURS PRN
Qty: 30 TABLET | Refills: 0 | Status: SHIPPED | OUTPATIENT
Start: 2025-02-17

## 2025-02-17 NOTE — ED INITIAL ASSESSMENT (HPI)
Patient to ER with c/o LLQ abdominal/pelvic pain that radiates to left lower back. Blood in urine x 1 month, \"haven't figured out the cause\" per patient's mom. Nausea for the past three weeks, no vomiting. Diarrhea intermittently after dinner for the past two weeks. Norco taken last around 2200. Patient has a hx of kidney stones. Zofran taken last at 2000.

## 2025-02-17 NOTE — ED QUICK NOTES
Patient refuses to use the scale to document proper weight, states, \"I used to have an eating disorder and I'm not doing that\". Explained to the patient that she will turn away from the scale and the weight would be documented in kgs. Patient then stated, \"I know the conversions from kg to pounds, I was told at [facility] that because I'm over 21 you do not need a weight\". Explained to the patient that a proper weight was needed for every patient in the ER, she states, \"I will see the weight in the my chart so again I'm not getting on the scale\". Dr. Mcmullen was notified.   Last recorded weight was seven months ago at 65 kg.

## 2025-02-20 ENCOUNTER — APPOINTMENT (OUTPATIENT)
Dept: GENERAL RADIOLOGY | Facility: HOSPITAL | Age: 25
End: 2025-02-20
Attending: EMERGENCY MEDICINE
Payer: COMMERCIAL

## 2025-02-20 ENCOUNTER — HOSPITAL ENCOUNTER (OUTPATIENT)
Facility: HOSPITAL | Age: 25
Setting detail: OBSERVATION
Discharge: HOME | End: 2025-02-22
Attending: EMERGENCY MEDICINE | Admitting: HOSPITALIST
Payer: COMMERCIAL

## 2025-02-20 DIAGNOSIS — N20.1 URETEROLITHIASIS: Primary | ICD-10-CM

## 2025-02-20 DIAGNOSIS — N20.0 KIDNEY STONE: ICD-10-CM

## 2025-02-20 DIAGNOSIS — N13.9 OBSTRUCTIVE UROPATHY: ICD-10-CM

## 2025-02-20 DIAGNOSIS — R52 INTRACTABLE PAIN: ICD-10-CM

## 2025-02-20 LAB
ANION GAP SERPL CALC-SCNC: 9 MMOL/L (ref 0–18)
B-HCG UR QL: NEGATIVE
BASOPHILS # BLD AUTO: 0.03 X10(3) UL (ref 0–0.2)
BASOPHILS NFR BLD AUTO: 0.3 %
BILIRUB UR QL STRIP.AUTO: NEGATIVE
BUN BLD-MCNC: 10 MG/DL (ref 9–23)
CALCIUM BLD-MCNC: 9.9 MG/DL (ref 8.7–10.6)
CHLORIDE SERPL-SCNC: 103 MMOL/L (ref 98–112)
CO2 SERPL-SCNC: 28 MMOL/L (ref 21–32)
COLOR UR AUTO: YELLOW
CREAT BLD-MCNC: 0.95 MG/DL
EGFRCR SERPLBLD CKD-EPI 2021: 87 ML/MIN/1.73M2 (ref 60–?)
EOSINOPHIL # BLD AUTO: 0.22 X10(3) UL (ref 0–0.7)
EOSINOPHIL NFR BLD AUTO: 2.5 %
ERYTHROCYTE [DISTWIDTH] IN BLOOD BY AUTOMATED COUNT: 18.8 %
GLUCOSE BLD-MCNC: 83 MG/DL (ref 70–99)
GLUCOSE UR STRIP.AUTO-MCNC: NORMAL MG/DL
HCT VFR BLD AUTO: 29 %
HGB BLD-MCNC: 9.1 G/DL
HYALINE CASTS #/AREA URNS AUTO: PRESENT /LPF
IMM GRANULOCYTES # BLD AUTO: 0.03 X10(3) UL (ref 0–1)
IMM GRANULOCYTES NFR BLD: 0.3 %
KETONES UR STRIP.AUTO-MCNC: NEGATIVE MG/DL
LEUKOCYTE ESTERASE UR QL STRIP.AUTO: 75
LYMPHOCYTES # BLD AUTO: 2.14 X10(3) UL (ref 1–4)
LYMPHOCYTES NFR BLD AUTO: 24.4 %
MCH RBC QN AUTO: 23.4 PG (ref 26–34)
MCHC RBC AUTO-ENTMCNC: 31.4 G/DL (ref 31–37)
MCV RBC AUTO: 74.6 FL
MONOCYTES # BLD AUTO: 0.83 X10(3) UL (ref 0.1–1)
MONOCYTES NFR BLD AUTO: 9.5 %
NEUTROPHILS # BLD AUTO: 5.52 X10 (3) UL (ref 1.5–7.7)
NEUTROPHILS # BLD AUTO: 5.52 X10(3) UL (ref 1.5–7.7)
NEUTROPHILS NFR BLD AUTO: 63 %
NITRITE UR QL STRIP.AUTO: NEGATIVE
OSMOLALITY SERPL CALC.SUM OF ELEC: 288 MOSM/KG (ref 275–295)
PH UR STRIP.AUTO: 6.5 [PH] (ref 5–8)
PLATELET # BLD AUTO: 407 10(3)UL (ref 150–450)
POTASSIUM SERPL-SCNC: 4.4 MMOL/L (ref 3.5–5.1)
PROT UR STRIP.AUTO-MCNC: NEGATIVE MG/DL
RBC # BLD AUTO: 3.89 X10(6)UL
RBC UR QL AUTO: NEGATIVE
SODIUM SERPL-SCNC: 140 MMOL/L (ref 136–145)
SP GR UR STRIP.AUTO: 1.02 (ref 1–1.03)
UROBILINOGEN UR STRIP.AUTO-MCNC: NORMAL MG/DL
WBC # BLD AUTO: 8.8 X10(3) UL (ref 4–11)

## 2025-02-20 PROCEDURE — 99285 EMERGENCY DEPT VISIT HI MDM: CPT

## 2025-02-20 PROCEDURE — 85025 COMPLETE CBC W/AUTO DIFF WBC: CPT | Performed by: EMERGENCY MEDICINE

## 2025-02-20 PROCEDURE — 81001 URINALYSIS AUTO W/SCOPE: CPT | Performed by: EMERGENCY MEDICINE

## 2025-02-20 PROCEDURE — 96375 TX/PRO/DX INJ NEW DRUG ADDON: CPT

## 2025-02-20 PROCEDURE — 96374 THER/PROPH/DIAG INJ IV PUSH: CPT

## 2025-02-20 PROCEDURE — 96361 HYDRATE IV INFUSION ADD-ON: CPT

## 2025-02-20 PROCEDURE — 87086 URINE CULTURE/COLONY COUNT: CPT | Performed by: EMERGENCY MEDICINE

## 2025-02-20 PROCEDURE — 81025 URINE PREGNANCY TEST: CPT

## 2025-02-20 PROCEDURE — 80048 BASIC METABOLIC PNL TOTAL CA: CPT | Performed by: EMERGENCY MEDICINE

## 2025-02-20 PROCEDURE — 74018 RADEX ABDOMEN 1 VIEW: CPT | Performed by: EMERGENCY MEDICINE

## 2025-02-20 RX ORDER — ONDANSETRON 2 MG/ML
4 INJECTION INTRAMUSCULAR; INTRAVENOUS EVERY 4 HOURS PRN
Status: DISCONTINUED | OUTPATIENT
Start: 2025-02-20 | End: 2025-02-20 | Stop reason: SDUPTHER

## 2025-02-20 RX ORDER — HYDROMORPHONE HYDROCHLORIDE 1 MG/ML
0.5 INJECTION, SOLUTION INTRAMUSCULAR; INTRAVENOUS; SUBCUTANEOUS ONCE
Status: COMPLETED | OUTPATIENT
Start: 2025-02-20 | End: 2025-02-20

## 2025-02-20 RX ORDER — ALBUTEROL SULFATE 90 UG/1
1 INHALANT RESPIRATORY (INHALATION) EVERY 4 HOURS PRN
Status: DISCONTINUED | OUTPATIENT
Start: 2025-02-20 | End: 2025-02-22

## 2025-02-20 RX ORDER — FAMOTIDINE 20 MG/1
20 TABLET, FILM COATED ORAL 2 TIMES DAILY
Status: DISCONTINUED | OUTPATIENT
Start: 2025-02-20 | End: 2025-02-22

## 2025-02-20 RX ORDER — LORATADINE 10 MG/1
10 TABLET ORAL DAILY
COMMUNITY

## 2025-02-20 RX ORDER — GABAPENTIN 300 MG/1
300 CAPSULE ORAL DAILY
COMMUNITY

## 2025-02-20 RX ORDER — GABAPENTIN 300 MG/1
300 CAPSULE ORAL DAILY
Status: DISCONTINUED | OUTPATIENT
Start: 2025-02-21 | End: 2025-02-22

## 2025-02-20 RX ORDER — GABAPENTIN 400 MG/1
400 CAPSULE ORAL NIGHTLY
Status: DISCONTINUED | OUTPATIENT
Start: 2025-02-20 | End: 2025-02-22

## 2025-02-20 RX ORDER — HYDROMORPHONE HYDROCHLORIDE 1 MG/ML
0.4 INJECTION, SOLUTION INTRAMUSCULAR; INTRAVENOUS; SUBCUTANEOUS EVERY 4 HOURS PRN
Status: DISCONTINUED | OUTPATIENT
Start: 2025-02-20 | End: 2025-02-22

## 2025-02-20 RX ORDER — PANTOPRAZOLE SODIUM 20 MG/1
20 TABLET, DELAYED RELEASE ORAL
Status: DISCONTINUED | OUTPATIENT
Start: 2025-02-21 | End: 2025-02-22

## 2025-02-20 RX ORDER — ALPRAZOLAM 0.5 MG
0.5 TABLET ORAL DAILY PRN
Status: DISCONTINUED | OUTPATIENT
Start: 2025-02-20 | End: 2025-02-22

## 2025-02-20 RX ORDER — METHOCARBAMOL 750 MG/1
750 TABLET, FILM COATED ORAL 2 TIMES DAILY PRN
Status: DISCONTINUED | OUTPATIENT
Start: 2025-02-20 | End: 2025-02-22

## 2025-02-20 RX ORDER — KETOROLAC TROMETHAMINE 15 MG/ML
15 INJECTION, SOLUTION INTRAMUSCULAR; INTRAVENOUS ONCE
Status: COMPLETED | OUTPATIENT
Start: 2025-02-20 | End: 2025-02-20

## 2025-02-20 RX ORDER — CARVEDILOL 6.25 MG/1
6.25 TABLET ORAL
Status: DISCONTINUED | OUTPATIENT
Start: 2025-02-21 | End: 2025-02-22

## 2025-02-20 RX ORDER — CODEINE SULFATE 30 MG/1
30 TABLET ORAL DAILY PRN
Status: DISCONTINUED | OUTPATIENT
Start: 2025-02-20 | End: 2025-02-22

## 2025-02-20 RX ORDER — FLUTICASONE PROPIONATE AND SALMETEROL 500; 50 UG/1; UG/1
1 POWDER RESPIRATORY (INHALATION) 2 TIMES DAILY
Status: DISCONTINUED | OUTPATIENT
Start: 2025-02-20 | End: 2025-02-22

## 2025-02-20 RX ORDER — BUTALBITAL, ACETAMINOPHEN, CAFFEINE AND CODEINE PHOSPHATE 50; 325; 40; 30 MG/1; MG/1; MG/1; MG/1
1 CAPSULE ORAL DAILY PRN
Status: DISCONTINUED | OUTPATIENT
Start: 2025-02-20 | End: 2025-02-20 | Stop reason: SDUPTHER

## 2025-02-20 RX ORDER — LAMOTRIGINE 200 MG/1
200 TABLET ORAL EVERY MORNING
Status: DISCONTINUED | OUTPATIENT
Start: 2025-02-21 | End: 2025-02-22

## 2025-02-20 RX ORDER — REPAGLINIDE 0.5 MG/1
200 TABLET ORAL 2 TIMES DAILY
Status: DISCONTINUED | OUTPATIENT
Start: 2025-02-20 | End: 2025-02-22

## 2025-02-20 RX ORDER — BUSPIRONE HYDROCHLORIDE 5 MG/1
20 TABLET ORAL 2 TIMES DAILY
Status: DISCONTINUED | OUTPATIENT
Start: 2025-02-20 | End: 2025-02-22

## 2025-02-20 RX ORDER — IVABRADINE 5 MG/1
5 TABLET, FILM COATED ORAL 2 TIMES DAILY
Status: DISCONTINUED | OUTPATIENT
Start: 2025-02-20 | End: 2025-02-22

## 2025-02-20 RX ORDER — DIPHENHYDRAMINE HCL 25 MG
50 CAPSULE ORAL NIGHTLY PRN
Status: DISCONTINUED | OUTPATIENT
Start: 2025-02-20 | End: 2025-02-22

## 2025-02-20 RX ORDER — SODIUM CHLORIDE 9 MG/ML
INJECTION, SOLUTION INTRAVENOUS CONTINUOUS
Status: DISCONTINUED | OUTPATIENT
Start: 2025-02-20 | End: 2025-02-22

## 2025-02-20 RX ORDER — REPAGLINIDE 0.5 MG/1
100 TABLET ORAL 2 TIMES DAILY
Status: DISCONTINUED | OUTPATIENT
Start: 2025-02-20 | End: 2025-02-20

## 2025-02-20 RX ORDER — CARVEDILOL 12.5 MG/1
12.5 TABLET ORAL
Status: DISCONTINUED | OUTPATIENT
Start: 2025-02-21 | End: 2025-02-22

## 2025-02-20 RX ORDER — DROSPIRENONE 4 MG/1
4 TABLET, FILM COATED ORAL NIGHTLY
COMMUNITY

## 2025-02-20 RX ORDER — ONDANSETRON 2 MG/ML
4 INJECTION INTRAMUSCULAR; INTRAVENOUS EVERY 6 HOURS PRN
Status: DISCONTINUED | OUTPATIENT
Start: 2025-02-20 | End: 2025-02-22

## 2025-02-20 RX ORDER — CETIRIZINE HYDROCHLORIDE 10 MG/1
10 TABLET ORAL DAILY
Status: DISCONTINUED | OUTPATIENT
Start: 2025-02-20 | End: 2025-02-22

## 2025-02-20 RX ORDER — HYDROMORPHONE HYDROCHLORIDE 1 MG/ML
0.5 INJECTION, SOLUTION INTRAMUSCULAR; INTRAVENOUS; SUBCUTANEOUS EVERY 30 MIN PRN
Status: DISCONTINUED | OUTPATIENT
Start: 2025-02-20 | End: 2025-02-20 | Stop reason: SDUPTHER

## 2025-02-20 RX ORDER — SODIUM CHLORIDE 9 MG/ML
INJECTION, SOLUTION INTRAVENOUS CONTINUOUS
Status: DISCONTINUED | OUTPATIENT
Start: 2025-02-20 | End: 2025-02-20

## 2025-02-20 RX ORDER — PHENOL 1.4 %
10 AEROSOL, SPRAY (ML) MUCOUS MEMBRANE NIGHTLY
COMMUNITY

## 2025-02-20 RX ORDER — DOCUSATE SODIUM 100 MG/1
100 CAPSULE, LIQUID FILLED ORAL 2 TIMES DAILY
Status: DISCONTINUED | OUTPATIENT
Start: 2025-02-20 | End: 2025-02-22

## 2025-02-20 RX ORDER — BUTALBITAL, ACETAMINOPHEN AND CAFFEINE 50; 325; 40 MG/1; MG/1; MG/1
1 TABLET ORAL DAILY PRN
Status: DISCONTINUED | OUTPATIENT
Start: 2025-02-20 | End: 2025-02-22

## 2025-02-20 RX ORDER — METOCLOPRAMIDE 5 MG/1
5 TABLET ORAL EVERY 6 HOURS PRN
Status: DISCONTINUED | OUTPATIENT
Start: 2025-02-20 | End: 2025-02-21

## 2025-02-20 RX ORDER — KETOROLAC TROMETHAMINE 15 MG/ML
15 INJECTION, SOLUTION INTRAMUSCULAR; INTRAVENOUS EVERY 6 HOURS PRN
Status: DISCONTINUED | OUTPATIENT
Start: 2025-02-20 | End: 2025-02-22

## 2025-02-20 RX ORDER — ONDANSETRON 2 MG/ML
4 INJECTION INTRAMUSCULAR; INTRAVENOUS ONCE
Status: COMPLETED | OUTPATIENT
Start: 2025-02-20 | End: 2025-02-20

## 2025-02-20 RX ORDER — TAMSULOSIN HYDROCHLORIDE 0.4 MG/1
0.4 CAPSULE ORAL DAILY
Status: DISCONTINUED | OUTPATIENT
Start: 2025-02-20 | End: 2025-02-22

## 2025-02-20 RX ORDER — TRAZODONE HYDROCHLORIDE 100 MG/1
100 TABLET ORAL NIGHTLY
Status: DISCONTINUED | OUTPATIENT
Start: 2025-02-20 | End: 2025-02-22

## 2025-02-20 RX ORDER — TAMSULOSIN HYDROCHLORIDE 0.4 MG/1
0.4 CAPSULE ORAL NIGHTLY
Status: DISCONTINUED | OUTPATIENT
Start: 2025-02-20 | End: 2025-02-20

## 2025-02-20 RX ORDER — HYDROXYZINE HYDROCHLORIDE 25 MG/1
25 TABLET, FILM COATED ORAL EVERY 8 HOURS PRN
Status: DISCONTINUED | OUTPATIENT
Start: 2025-02-20 | End: 2025-02-22

## 2025-02-20 NOTE — ED PROVIDER NOTES
Patient Seen in: [facility] Emergency Department      History     Chief Complaint   Patient presents with    Flank Pain     Stated Complaint: known kidney stone, here for admission bc can't wait til surgery next week due *    Subjective:   HPI      This is a 24-year-old female presents emergency room for evaluation of flank pain, patient was diagnosed with left-sided 8 mm kidney stone on Sunday, has not having increasing pain, saw her urologist yesterday who instructed her to go to the emergency room if symptoms worsen for admission.  Patient reports she is scheduled to have surgery next week but cannot handle the pain at home.  Has been feeling nauseous but has not vomited.  Denies any fevers.  Denies chest pain or shortness of breath.  Denies urinary symptoms.    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    waiting on genetic testing confirmation              Past Surgical History:   Procedure Laterality Date    Appendectomy  2017    Endoscopy of bowel pouch  2012    EGD    Other surgical history      epidurals    Other surgical history  2020    spinal stimulatro x 2    Other surgical history  2021    blood patches                Social History     Socioeconomic History    Marital status: Single   Tobacco Use    Smoking status: Never    Smokeless tobacco: Never   Vaping Use    Vaping status: Never Used   Substance and Sexual Activity    Alcohol use: Yes     Comment: social    Drug use: No    Sexual activity: Never     Partners: Male     Birth control/protection: Abstinence     Social Drivers of Health     Food Insecurity: No Food Insecurity (4/3/2023)    Received from [facility]  [facility], [facility], [facility]    Hunger Vital Sign     Worried About Running Out of Food in the Last Year: Never true     Ran Out of Food in the Last Year: Never true    Received from [facility]                  Physical Exam     ED Triage Vitals [02/20/25 1458]   /73   Pulse 102   Resp 18   Temp 98.5 °F (36.9 °C)   Temp src Temporal   SpO2 99 %   O2 Device None (Room air)       Current Vitals:   Vital Signs  BP: (!) 134/93  Pulse: 97  Resp: 16  Temp: 97.9 °F (36.6 °C)  Temp src: Oral  MAP (mmHg): (!) 101    Oxygen Therapy  SpO2: 90 %  O2 Device: None (Room air)  Pulse Oximetry Type: Intermittent  Oximetry Probe Site Changed: No        Physical Exam  GENERAL: Patient is awake, alert, well-appearing, in no acute distress.  HEENT:  no scleral icterus.  Mucous membranes are slightly dry   HEART: Regular rate and rhythm, no murmurs.  LUNGS: Clear to auscultation bilaterally.  No Rales, no rhonchi, no wheezing, no stridor.  ABDOMEN: Soft, nondistended,non tender, bowel sounds are present, no rebound, no rigidity, no guarding.no pulsatile masses. No CVA tenderness  EXTREMITIES: No peripheral edema, no calf tenderness    ED Course     Labs Reviewed   CBC WITH DIFFERENTIAL WITH PLATELET - Abnormal; Notable for the following components:       Result Value    HGB 9.1 (*)     HCT 29.0 (*)     MCV 74.6 (*)     MCH 23.4 (*)     All other components within normal limits   URINALYSIS WITH CULTURE REFLEX - Abnormal; Notable for the following components:    Clarity Urine Turbid (*)     Leukocyte Esterase Urine 75 (*)     WBC Urine 21-50 (*)     RBC Urine 6-10 (*)     Bacteria Urine 2+ (*)     Squamous Epi. Cells Few (*)     Hyaline Casts Present (*)     All other components within normal limits   BASIC METABOLIC PANEL (8) - Normal   POCT PREGNANCY URINE - Normal   RAINBOW DRAW LAVENDER   RAINBOW DRAW LIGHT GREEN   RAINBOW DRAW BLUE   RAINBOW DRAW GOLD   URINE CULTURE,  ROUTINE                   MDM        Differential diagnosis before testing includes but not limited to kidney stone, electrolyte abnormality, acute kidney injury, urinary tract infection/pyonephritis, which is a medical condition that poses a threat to life/function      Radiographic images  I personally reviewed the radiographs and my individual interpretation shows KUB, left-sided calculus noted.  I also reviewed the official reports that showed 2 stable.  Mid ureteral calculi on the left.    Discussion of management (consult/physicians, social work, pharmacy,ect) urology Dr. Pagan, [facility] Dr. Millan  External chart review included CT from 2/17/2025 did reveal left proximal and mid ureteral obstructing calcifications    Medications Provided: IV normal saline, Toradol, Dilaudid    Course of Events during Emergency Room Visit include IV established, blood work obtained.  CBC will count 8.8 hemoglobin 9.1 platelet 407.  Previous blood work reviewed, hemoglobin on 2/17/2025 was 9.7.  Urinalysis negative nitrate 75 leukocyte esterase.  Chemistry was unremarkable.  Negative pregnancy test.  On reevaluation patient is still quite uncomfortable, discussed with urologist and hospitalist, will admit for further evaluation treatment.  Patient agrees with plan    Shared decision making was utilized           Disposition:    Admission  I have discussed with the patient the results of test, differential diagnosis, and treatment plan. They expressed clear understanding of these instructions and agrees to the plan provided.     Note to patient: The 21st Century Cures Act makes medical notes like these available to patients in the interest of transparency. However, this is a medical document intended as peer to peer communication. It is written in medical language and may contain abbreviations or verbiage that are unfamiliar. It may appear blunt or direct. Medical documents are intended to carry relevant  information, facts as evident, and the clinical opinion of the practitioner.             Admission disposition: 2/20/2025  6:49 PM           Medical Decision Making      Disposition and Plan     Clinical Impression:  1. Ureterolithiasis    2. Intractable pain    3. Obstructive uropathy         Disposition:  Admit  2/20/2025  6:49 pm    Follow-up:  No follow-up provider specified.        Medications Prescribed:  Current Discharge Medication List              Supplementary Documentation:         Hospital Problems       Present on Admission  Date Reviewed: 12/4/2024            ICD-10-CM Noted POA    * (Principal) Ureterolithiasis N20.1 2/20/2025 Unknown    Intractable pain R52 2/20/2025 Unknown    Obstructive uropathy N13.9 2/20/2025 Unknown

## 2025-02-20 NOTE — ED INITIAL ASSESSMENT (HPI)
Left flank pain since Sunday nauseated  diagnosed as kidney stone . Patient is scheduled for surgery next week  but pain is getting worse her doctor advised to go to  ER .

## 2025-02-21 ENCOUNTER — APPOINTMENT (OUTPATIENT)
Dept: GENERAL RADIOLOGY | Facility: HOSPITAL | Age: 25
End: 2025-02-21
Payer: COMMERCIAL

## 2025-02-21 ENCOUNTER — ANESTHESIA EVENT (OUTPATIENT)
Dept: SURGERY | Facility: HOSPITAL | Age: 25
End: 2025-02-21
Payer: COMMERCIAL

## 2025-02-21 ENCOUNTER — ANESTHESIA (OUTPATIENT)
Dept: SURGERY | Facility: HOSPITAL | Age: 25
End: 2025-02-21
Payer: COMMERCIAL

## 2025-02-21 LAB
ANION GAP SERPL CALC-SCNC: 8 MMOL/L (ref 0–18)
BASOPHILS # BLD AUTO: 0.02 X10(3) UL (ref 0–0.2)
BASOPHILS NFR BLD AUTO: 0.3 %
BUN BLD-MCNC: 10 MG/DL (ref 9–23)
CALCIUM BLD-MCNC: 9.1 MG/DL (ref 8.7–10.6)
CHLORIDE SERPL-SCNC: 106 MMOL/L (ref 98–112)
CO2 SERPL-SCNC: 26 MMOL/L (ref 21–32)
CREAT BLD-MCNC: 0.95 MG/DL
EGFRCR SERPLBLD CKD-EPI 2021: 87 ML/MIN/1.73M2 (ref 60–?)
EOSINOPHIL # BLD AUTO: 0.21 X10(3) UL (ref 0–0.7)
EOSINOPHIL NFR BLD AUTO: 2.8 %
ERYTHROCYTE [DISTWIDTH] IN BLOOD BY AUTOMATED COUNT: 18.9 %
GLUCOSE BLD-MCNC: 83 MG/DL (ref 70–99)
HCT VFR BLD AUTO: 26.7 %
HGB BLD-MCNC: 8 G/DL
IMM GRANULOCYTES # BLD AUTO: 0.02 X10(3) UL (ref 0–1)
IMM GRANULOCYTES NFR BLD: 0.3 %
LYMPHOCYTES # BLD AUTO: 2.35 X10(3) UL (ref 1–4)
LYMPHOCYTES NFR BLD AUTO: 31.1 %
MAGNESIUM SERPL-MCNC: 1.8 MG/DL (ref 1.6–2.6)
MCH RBC QN AUTO: 23.4 PG (ref 26–34)
MCHC RBC AUTO-ENTMCNC: 30 G/DL (ref 31–37)
MCV RBC AUTO: 78.1 FL
MONOCYTES # BLD AUTO: 0.67 X10(3) UL (ref 0.1–1)
MONOCYTES NFR BLD AUTO: 8.9 %
NEUTROPHILS # BLD AUTO: 4.29 X10 (3) UL (ref 1.5–7.7)
NEUTROPHILS # BLD AUTO: 4.29 X10(3) UL (ref 1.5–7.7)
NEUTROPHILS NFR BLD AUTO: 56.6 %
OSMOLALITY SERPL CALC.SUM OF ELEC: 288 MOSM/KG (ref 275–295)
PLATELET # BLD AUTO: 363 10(3)UL (ref 150–450)
POTASSIUM SERPL-SCNC: 3.9 MMOL/L (ref 3.5–5.1)
RBC # BLD AUTO: 3.42 X10(6)UL
SODIUM SERPL-SCNC: 140 MMOL/L (ref 136–145)
WBC # BLD AUTO: 7.6 X10(3) UL (ref 4–11)

## 2025-02-21 PROCEDURE — 85025 COMPLETE CBC W/AUTO DIFF WBC: CPT

## 2025-02-21 PROCEDURE — 80048 BASIC METABOLIC PNL TOTAL CA: CPT

## 2025-02-21 PROCEDURE — 83735 ASSAY OF MAGNESIUM: CPT

## 2025-02-21 PROCEDURE — 88300 SURGICAL PATH GROSS: CPT | Performed by: UROLOGY

## 2025-02-21 PROCEDURE — 82365 CALCULUS SPECTROSCOPY: CPT | Performed by: UROLOGY

## 2025-02-21 PROCEDURE — 0T778DZ DILATION OF LEFT URETER WITH INTRALUMINAL DEVICE, VIA NATURAL OR ARTIFICIAL OPENING ENDOSCOPIC: ICD-10-PCS | Performed by: UROLOGY

## 2025-02-21 PROCEDURE — 0TC78ZZ EXTIRPATION OF MATTER FROM LEFT URETER, VIA NATURAL OR ARTIFICIAL OPENING ENDOSCOPIC: ICD-10-PCS | Performed by: UROLOGY

## 2025-02-21 PROCEDURE — 94760 N-INVAS EAR/PLS OXIMETRY 1: CPT

## 2025-02-21 PROCEDURE — 0TC18ZZ EXTIRPATION OF MATTER FROM LEFT KIDNEY, VIA NATURAL OR ARTIFICIAL OPENING ENDOSCOPIC: ICD-10-PCS | Performed by: UROLOGY

## 2025-02-21 DEVICE — URETERAL STENT
Type: IMPLANTABLE DEVICE | Site: URETER | Status: FUNCTIONAL
Brand: ASCERTA™

## 2025-02-21 RX ORDER — MAGNESIUM SULFATE HEPTAHYDRATE 40 MG/ML
2 INJECTION, SOLUTION INTRAVENOUS ONCE
Status: COMPLETED | OUTPATIENT
Start: 2025-02-21 | End: 2025-02-21

## 2025-02-21 RX ORDER — MIDAZOLAM HYDROCHLORIDE 1 MG/ML
1 INJECTION INTRAMUSCULAR; INTRAVENOUS EVERY 5 MIN PRN
Status: DISCONTINUED | OUTPATIENT
Start: 2025-02-21 | End: 2025-02-21 | Stop reason: HOSPADM

## 2025-02-21 RX ORDER — HYDROCODONE BITARTRATE AND ACETAMINOPHEN 10; 325 MG/1; MG/1
1 TABLET ORAL ONCE AS NEEDED
Status: DISCONTINUED | OUTPATIENT
Start: 2025-02-21 | End: 2025-02-21 | Stop reason: HOSPADM

## 2025-02-21 RX ORDER — CEFAZOLIN SODIUM 1 G/3ML
INJECTION, POWDER, FOR SOLUTION INTRAMUSCULAR; INTRAVENOUS AS NEEDED
Status: DISCONTINUED | OUTPATIENT
Start: 2025-02-21 | End: 2025-02-21 | Stop reason: SURG

## 2025-02-21 RX ORDER — HYDROMORPHONE HYDROCHLORIDE 1 MG/ML
0.6 INJECTION, SOLUTION INTRAMUSCULAR; INTRAVENOUS; SUBCUTANEOUS EVERY 5 MIN PRN
Status: DISCONTINUED | OUTPATIENT
Start: 2025-02-21 | End: 2025-02-21 | Stop reason: HOSPADM

## 2025-02-21 RX ORDER — LIDOCAINE HYDROCHLORIDE 20 MG/ML
JELLY TOPICAL AS NEEDED
Status: DISCONTINUED | OUTPATIENT
Start: 2025-02-21 | End: 2025-02-21 | Stop reason: HOSPADM

## 2025-02-21 RX ORDER — HYDROCODONE BITARTRATE AND ACETAMINOPHEN 10; 325 MG/1; MG/1
2 TABLET ORAL ONCE AS NEEDED
Status: DISCONTINUED | OUTPATIENT
Start: 2025-02-21 | End: 2025-02-21 | Stop reason: HOSPADM

## 2025-02-21 RX ORDER — HYDROMORPHONE HYDROCHLORIDE 1 MG/ML
0.4 INJECTION, SOLUTION INTRAMUSCULAR; INTRAVENOUS; SUBCUTANEOUS EVERY 5 MIN PRN
Status: DISCONTINUED | OUTPATIENT
Start: 2025-02-21 | End: 2025-02-21 | Stop reason: HOSPADM

## 2025-02-21 RX ORDER — SODIUM CHLORIDE, SODIUM LACTATE, POTASSIUM CHLORIDE, CALCIUM CHLORIDE 600; 310; 30; 20 MG/100ML; MG/100ML; MG/100ML; MG/100ML
INJECTION, SOLUTION INTRAVENOUS CONTINUOUS PRN
Status: DISCONTINUED | OUTPATIENT
Start: 2025-02-21 | End: 2025-02-21 | Stop reason: SURG

## 2025-02-21 RX ORDER — ONDANSETRON 2 MG/ML
4 INJECTION INTRAMUSCULAR; INTRAVENOUS EVERY 6 HOURS PRN
Status: DISCONTINUED | OUTPATIENT
Start: 2025-02-21 | End: 2025-02-21 | Stop reason: HOSPADM

## 2025-02-21 RX ORDER — LIDOCAINE HYDROCHLORIDE 10 MG/ML
INJECTION, SOLUTION EPIDURAL; INFILTRATION; INTRACAUDAL; PERINEURAL AS NEEDED
Status: DISCONTINUED | OUTPATIENT
Start: 2025-02-21 | End: 2025-02-21 | Stop reason: SURG

## 2025-02-21 RX ORDER — MIDAZOLAM HYDROCHLORIDE 1 MG/ML
INJECTION INTRAMUSCULAR; INTRAVENOUS AS NEEDED
Status: DISCONTINUED | OUTPATIENT
Start: 2025-02-21 | End: 2025-02-21 | Stop reason: SURG

## 2025-02-21 RX ORDER — SODIUM CHLORIDE, SODIUM LACTATE, POTASSIUM CHLORIDE, CALCIUM CHLORIDE 600; 310; 30; 20 MG/100ML; MG/100ML; MG/100ML; MG/100ML
INJECTION, SOLUTION INTRAVENOUS CONTINUOUS
Status: DISCONTINUED | OUTPATIENT
Start: 2025-02-21 | End: 2025-02-21 | Stop reason: HOSPADM

## 2025-02-21 RX ORDER — IOPAMIDOL 612 MG/ML
INJECTION, SOLUTION INTRAVASCULAR AS NEEDED
Status: DISCONTINUED | OUTPATIENT
Start: 2025-02-21 | End: 2025-02-21 | Stop reason: HOSPADM

## 2025-02-21 RX ORDER — HYDROMORPHONE HYDROCHLORIDE 1 MG/ML
0.2 INJECTION, SOLUTION INTRAMUSCULAR; INTRAVENOUS; SUBCUTANEOUS EVERY 5 MIN PRN
Status: DISCONTINUED | OUTPATIENT
Start: 2025-02-21 | End: 2025-02-21 | Stop reason: HOSPADM

## 2025-02-21 RX ORDER — PROCHLORPERAZINE EDISYLATE 5 MG/ML
10 INJECTION INTRAMUSCULAR; INTRAVENOUS EVERY 6 HOURS PRN
Status: DISCONTINUED | OUTPATIENT
Start: 2025-02-21 | End: 2025-02-22

## 2025-02-21 RX ORDER — NALOXONE HYDROCHLORIDE 0.4 MG/ML
0.08 INJECTION, SOLUTION INTRAMUSCULAR; INTRAVENOUS; SUBCUTANEOUS AS NEEDED
Status: DISCONTINUED | OUTPATIENT
Start: 2025-02-21 | End: 2025-02-21 | Stop reason: HOSPADM

## 2025-02-21 RX ORDER — ACETAMINOPHEN 500 MG
1000 TABLET ORAL ONCE AS NEEDED
Status: DISCONTINUED | OUTPATIENT
Start: 2025-02-21 | End: 2025-02-21 | Stop reason: HOSPADM

## 2025-02-21 RX ORDER — DEXAMETHASONE SODIUM PHOSPHATE 4 MG/ML
VIAL (ML) INJECTION AS NEEDED
Status: DISCONTINUED | OUTPATIENT
Start: 2025-02-21 | End: 2025-02-21 | Stop reason: SURG

## 2025-02-21 RX ORDER — PROCHLORPERAZINE EDISYLATE 5 MG/ML
5 INJECTION INTRAMUSCULAR; INTRAVENOUS EVERY 8 HOURS PRN
Status: DISCONTINUED | OUTPATIENT
Start: 2025-02-21 | End: 2025-02-21 | Stop reason: HOSPADM

## 2025-02-21 RX ORDER — MEPERIDINE HYDROCHLORIDE 25 MG/ML
12.5 INJECTION INTRAMUSCULAR; INTRAVENOUS; SUBCUTANEOUS AS NEEDED
Status: DISCONTINUED | OUTPATIENT
Start: 2025-02-21 | End: 2025-02-21 | Stop reason: HOSPADM

## 2025-02-21 RX ADMIN — MIDAZOLAM HYDROCHLORIDE 2 MG: 1 INJECTION INTRAMUSCULAR; INTRAVENOUS at 17:05:00

## 2025-02-21 RX ADMIN — SODIUM CHLORIDE, SODIUM LACTATE, POTASSIUM CHLORIDE, CALCIUM CHLORIDE: 600; 310; 30; 20 INJECTION, SOLUTION INTRAVENOUS at 17:04:00

## 2025-02-21 RX ADMIN — LIDOCAINE HYDROCHLORIDE 50 MG: 10 INJECTION, SOLUTION EPIDURAL; INFILTRATION; INTRACAUDAL; PERINEURAL at 17:07:00

## 2025-02-21 RX ADMIN — DEXAMETHASONE SODIUM PHOSPHATE 8 MG: 4 MG/ML VIAL (ML) INJECTION at 17:15:00

## 2025-02-21 RX ADMIN — CEFAZOLIN SODIUM 2 G: 1 INJECTION, POWDER, FOR SOLUTION INTRAMUSCULAR; INTRAVENOUS at 17:12:00

## 2025-02-21 RX ADMIN — ONDANSETRON 4 MG: 2 INJECTION INTRAMUSCULAR; INTRAVENOUS at 17:24:00

## 2025-02-21 NOTE — PLAN OF CARE
Patient A&O X4 on RA. VSS, /IS. Voiding- straining all urine, LBM 02/18. Admitted with kidney stones. Urology aware of consult in ED. IVF infusing per orders. Pain minimally controlled with IV medication. Up ad john. Reminded to use call light. Plan for urology to see tomorrow.

## 2025-02-21 NOTE — PROGRESS NOTES
NURSING ADMISSION NOTE      Patient admitted via cart.  Oriented to room.  Safety precautions initiated.  Bed in low position.  Call light in reach.  Admission navigator completed at bedside with patient and mother. Updated on POC. Denies any redness/bumps/bruises/wounds.

## 2025-02-21 NOTE — ANESTHESIA PREPROCEDURE EVALUATION
PRE-OP EVALUATION    Patient Name: Vicki Chavez    Admit Diagnosis: Ureterolithiasis [N20.1]  Obstructive uropathy [N13.9]  Intractable pain [R52]    Pre-op Diagnosis: Kidney stone [N20.0]    CYSTOSCOPY LEFT RETROGRADE PYLOGRAM, LEFT URETEROSCOPY WITH LASER LITHOTRIPSY, INSERTION OF LEFT URETERAL STENT    Anesthesia Procedure: CYSTOSCOPY LEFT RETROGRADE PYLOGRAM, LEFT URETEROSCOPY WITH LASER LITHOTRIPSY, INSERTION OF LEFT URETERAL STENT (Left)    Surgeons and Role:     * Ivan Wolf MD - Primary    Pre-op vitals reviewed.  Temp: 98.1 °F (36.7 °C)  Pulse: 85  Resp: 16  BP: 125/76  SpO2: 95 %  Body mass index is 23.13 kg/m².    Current medications reviewed.  Hospital Medications:   [Transfer Hold] prochlorperazine (Compazine) 10 MG/2ML injection 10 mg  10 mg Intravenous Q6H PRN    [COMPLETED] magnesium sulfate in sterile water for injection 2 g/50mL IVPB premix 2 g  2 g Intravenous Once    [COMPLETED] sodium chloride 0.9 % IV bolus 500 mL  500 mL Intravenous Once    [COMPLETED] ketorolac (Toradol) 15 MG/ML injection 15 mg  15 mg Intravenous Once    [COMPLETED] ondansetron (Zofran) 4 MG/2ML injection 4 mg  4 mg Intravenous Once    [COMPLETED] HYDROmorphone (Dilaudid) 1 MG/ML injection 0.5 mg  0.5 mg Intravenous Once    sodium chloride 0.9% infusion   Intravenous Continuous    [Transfer Hold] ondansetron (Zofran) 4 MG/2ML injection 4 mg  4 mg Intravenous Q6H PRN    [Transfer Hold] ketorolac (Toradol) 15 MG/ML injection 15 mg  15 mg Intravenous Q6H PRN    [Transfer Hold] HYDROmorphone (Dilaudid) 1 MG/ML injection 0.4 mg  0.4 mg Intravenous Q4H PRN    [Transfer Hold] tamsulosin (Flomax) cap 0.4 mg  0.4 mg Oral Daily    [Transfer Hold] albuterol (Ventolin HFA) 108 (90 Base) MCG/ACT inhaler 1 puff  1 puff Inhalation Q4H PRN    [Transfer Hold] ALPRAZolam (Xanax) tab 0.5 mg  0.5 mg Oral Daily PRN    [Transfer Hold] busPIRone (Buspar) tab 20 mg  20 mg Oral BID    [Transfer Hold] carvedilol (Coreg) tab 12.5 mg  12.5  mg Oral Before breakfast    [Transfer Hold] carvedilol (Coreg) tab 6.25 mg  6.25 mg Oral Daily with dinner    [Transfer Hold] gabapentin (Neurontin) cap 400 mg  400 mg Oral Nightly    [Transfer Hold] hydrOXYzine (Atarax) tab 25 mg  25 mg Oral Q8H PRN    [Transfer Hold] ivabradine (Corlanor) tab 5 mg  5 mg Oral BID    [Transfer Hold] lamoTRIgine (LaMICtal) tab 200 mg  200 mg Oral QAM    [Transfer Hold] fluticasone-salmeterol (Advair Diskus) 500-50 MCG/ACT inhaler 1 puff  1 puff Inhalation BID    [Transfer Hold] pantoprazole (Protonix) DR tab 20 mg  20 mg Oral QAM AC    [Transfer Hold] risperiDONE (RisperDAL) tab 1.5 mg  1.5 mg Oral Nightly    [Transfer Hold] traZODone (Desyrel) tab 100 mg  100 mg Oral Nightly    [Transfer Hold] methocarbamol (Robaxin) tab 750 mg  750 mg Oral BID PRN    [Transfer Hold] famotidine (Pepcid) tab 20 mg  20 mg Oral BID    [Transfer Hold] docusate sodium (Colace) cap 100 mg  100 mg Oral BID    [Transfer Hold] cetirizine (ZyrTEC) tab 10 mg  10 mg Oral Daily    [Transfer Hold] diphenhydrAMINE (Benadryl) cap/tab 50 mg  50 mg Oral Nightly PRN    influenza virus trivalent PF (Fluzone Trivalent) 0.5 mL IM injection (ages 6 months to 64 years) 0.5 mL  0.5 mL Intramuscular Prior to discharge    [Transfer Hold] butalbital-acetaminophen-caffeine (Fioricet) -40 MG per tab 1 tablet  1 tablet Oral Daily PRN    And    [Transfer Hold] codeine tab 30 mg  30 mg Oral Daily PRN    cefTRIAXone (Rocephin) 1 g in sodium chloride 0.9% 100 mL IVPB-ADDV  1 g Intravenous Q24H    [Transfer Hold] fluvoxaMINE (Luvox) tab 200 mg  200 mg Oral BID    [Transfer Hold] gabapentin (Neurontin) cap 300 mg  300 mg Oral Daily       Outpatient Medications:   Prescriptions Prior to Admission[1]    Allergies: Gadolinium, Iron, Iron dextran, Radiology contrast iodinated dyes, Adhesive tape, and Drug [skin adhesives]      Anesthesia Evaluation    Patient summary reviewed.    Anesthetic Complications            GI/Hepatic/Renal                                 Cardiovascular                                                       Endo/Other                                  Pulmonary      (+) asthma                     Neuro/Psych      (+) depression           (+) neuromuscular disease                     Past Surgical History:   Procedure Laterality Date    Appendectomy  2017    Endoscopy of bowel pouch  2012    EGD    Other surgical history      epidurals    Other surgical history  2020    spinal stimulatro x 2    Other surgical history  2021    blood patches     Social History     Socioeconomic History    Marital status: Single   Tobacco Use    Smoking status: Never    Smokeless tobacco: Never   Vaping Use    Vaping status: Never Used   Substance and Sexual Activity    Alcohol use: Yes     Comment: social    Drug use: No    Sexual activity: Never     Partners: Male     Birth control/protection: Abstinence     History   Drug Use No     Available pre-op labs reviewed.  Lab Results   Component Value Date    WBC 7.6 02/21/2025    RBC 3.42 (L) 02/21/2025    HGB 8.0 (L) 02/21/2025    HCT 26.7 (L) 02/21/2025    MCV 78.1 (L) 02/21/2025    MCH 23.4 (L) 02/21/2025    MCHC 30.0 (L) 02/21/2025    RDW 18.9 02/21/2025    .0 02/21/2025     Lab Results   Component Value Date     02/21/2025    K 3.9 02/21/2025     02/21/2025    CO2 26.0 02/21/2025    BUN 10 02/21/2025    CREATSERUM 0.95 02/21/2025    GLU 83 02/21/2025    CA 9.1 02/21/2025            Airway      Mallampati: II  Mouth opening: 3 FB  TM distance: > 6 cm  Neck ROM: full Cardiovascular    Cardiovascular exam normal.  Rhythm: regular  Rate: normal     Dental             Pulmonary    Pulmonary exam normal.                 Other findings              ASA: 2   Plan: general  NPO status verified and patient meets guidelines.          Plan/risks discussed with: patient and significant other                Present on Admission:  **None**             [1]   Medications  Prior to Admission   Medication Sig Dispense Refill Last Dose/Taking    Simethicone 250 MG Oral Cap Take 250 mg by mouth at bedtime.   2/19/2025    Atogepant 60 MG Oral Tab Take 60 mg by mouth at bedtime.   2/19/2025    Melatonin 10 MG Oral Tab Take 10 mg by mouth at bedtime.   2/19/2025    loratadine 10 MG Oral Tab Take 1 tablet (10 mg total) by mouth daily.   2/19/2025    Drospirenone (SLYND) 4 MG Oral Tab Take 4 mg by mouth at bedtime.   2/19/2025    Viloxazine HCl  MG Oral Capsule SR 24 Hr Take 200 mg by mouth daily.   2/20/2025    gabapentin 300 MG Oral Cap Take 1 capsule (300 mg total) by mouth daily.   2/20/2025    ivabradine 5 MG Oral Tab Take 1 tablet (5 mg total) by mouth 2 (two) times daily. 5mg in AM, 2.5 mg at bedtime   2/20/2025    carvedilol 6.25 MG Oral Tab Take 2 tablets (12.5 mg total) by mouth daily.   2/20/2025    carvedilol 6.25 MG Oral Tab Take 1 tablet (6.25 mg total) by mouth at bedtime.   2/19/2025    ALPRAZolam 0.5 MG Oral Tab Take 1 tablet (0.5 mg total) by mouth daily as needed for Anxiety.   Taking As Needed    ondansetron 4 MG Oral Tablet Dispersible Take 1 tablet (4 mg total) by mouth every 8 (eight) hours as needed for Nausea (vomiting). 10 tablet 0 Taking As Needed    HYDROcodone-acetaminophen 5-325 MG Oral Tab Take 1 tablet by mouth every 6 (six) hours as needed for Pain. Do not drive or operate machinery within 8 hours of taking this medication 20 tablet 0 Taking As Needed    tamsulosin (FLOMAX) 0.4 MG Oral Cap Take 1 capsule (0.4 mg total) by mouth daily. 10 capsule 0 Taking    metFORMIN  MG Oral Tablet 24 Hr Take 1 tablet (750 mg total) by mouth daily. 30 tablet 0 2/20/2025    gabapentin 400 MG Oral Cap Take 1 capsule (400 mg total) by mouth nightly.   2/19/2025    HYDROcodone-acetaminophen 5-325 MG Oral Tab Take 1 tablet by mouth every 6 (six) hours as needed for Pain.   Taking As Needed    busPIRone 10 MG Oral Tab Take 2 tablets (20 mg total) by mouth in the morning  and 2 tablets (20 mg total) before bedtime.   2/20/2025    lamoTRIgine 200 MG Oral Tab Take 1 tablet (200 mg total) by mouth every morning.   2/20/2025    omeprazole 20 MG Oral Capsule Delayed Release Take 1 capsule (20 mg total) by mouth 2 (two) times daily before meals.   2/20/2025    Promethazine HCl 12.5 MG Oral Tab Take 1 tablet (12.5 mg total) by mouth every 6 (six) hours as needed.   Taking As Needed    traMADol 50 MG Oral Tab Take 1 tablet (50 mg total) by mouth every 12 (twelve) hours as needed.   Taking As Needed    traZODone 50 MG Oral Tab Take 2 tablets (100 mg total) by mouth nightly.   2/19/2025    Butalbital-APAP-Caff-Cod -87-30 MG Oral Cap Take 1 capsule by mouth.   Taking    risperiDONE 1 MG Oral Tab Take 1 tablet (1 mg total) by mouth nightly. Takes with 0.5 for total of 1.5   2/19/2025    risperiDONE 0.5 MG Oral Tab Take 1 tablet (0.5 mg total) by mouth nightly. TAKE AT BEDTIME   2/19/2025    Mometasone Furo-Formoterol Fum 200-5 MCG/ACT Inhalation Aerosol Inhale 2 puffs into the lungs 2 (two) times daily.   2/19/2025    hydrOXYzine 50 MG Oral Tab Take 1 tablet (50 mg total) by mouth every 8 (eight) hours as needed for Anxiety or Itching.   Taking As Needed    ondansetron (ZOFRAN) 8 MG tablet TAKE 1 TABLET(8 MG) BY MOUTH EVERY 8 HOURS AS NEEDED FOR NAUSEA 30 tablet 0 Taking    fluvoxaMINE 100 MG Oral Tab Take 2 tablets (200 mg total) by mouth 2 (two) times daily.   2/20/2025    methocarbamol 750 MG Oral Tab Take 1 tablet (750 mg total) by mouth 2 (two) times daily as needed. 60 tablet 3 2/20/2025    Levocetirizine Dihydrochloride 5 MG Oral Tab Take 1 tablet (5 mg total) by mouth every evening.   2/20/2025    Albuterol Sulfate  (90 Base) MCG/ACT Inhalation Aero Soln INHALE 1 TO 2 PUFFS BY MOUTH EVERY 4 HOURS 15 MINUTES BEFORE EXERCISE AS NEEDED (Patient taking differently: Inhale 2 puffs into the lungs in the morning and 2 puffs before bedtime. INHALE 1 TO 2 PUFFS BY MOUTH EVERY 4 HOURS  15 MINUTES BEFORE EXERCISE AS NEEDED. Reports takes twice daily before steroid inhaler..) 3 each 0 Taking Differently    EPINEPHrine 0.3 MG/0.3ML Injection Solution Auto-injector Inject 0.3 mL (1 each total) into the muscle daily.   Taking    diphenhydrAMINE HCl 50 MG Oral Cap Take 1 capsule (50 mg total) by mouth nightly as needed.   2/19/2025    famotidine 20 MG Oral Tab Take 1 tablet (20 mg total) by mouth 2 (two) times daily.   2/20/2025    Docusate Sodium (STOOL SOFTENER OR) Take 300 mg by mouth in the morning and 300 mg before bedtime.   2/20/2025    DICLOFENAC OR Take 20 mg by mouth as needed (migraine). 50 mg as needed for migraine   Taking As Needed    Ergocalciferol (VITAMIN D OR) Take 2,000 Units by mouth daily.     Taking    ibuprofen 600 MG Oral Tab Take 1 tablet (600 mg total) by mouth every 8 (eight) hours as needed for Pain or Fever. 30 tablet 0     CUSTOM MEDICATION Rg3 2mg /NR 2mg 1 spray each nostril twice a day Disp 1 refill 1 1 each 0     Lactobacillus Rhamnosus, GG, (CULTURELLE) Oral Cap Take 1 capsule by mouth every morning.       phenazopyridine 100 MG Oral Tab Take 2 tablets (200 mg total) by mouth 3 (three) times daily as needed for Pain.       Aluminum Chloride (DRYSOL) 20 % External Solution Apply to AA underarms QHS 60 mL 2     Spacer/Aero-Holding Chambers (POCKET SPACER) Does not apply Device Use with inhaler 1 Device 1

## 2025-02-21 NOTE — DIETARY NOTE
[facility]   part of [facility]   CLINICAL NUTRITION    Vicki Chavez     Admitting diagnosis:  Ureterolithiasis [N20.1]  Obstructive uropathy [N13.9]  Intractable pain [R52]    Ht: 167.6 cm (5' 6\")  Wt:  .   Body mass index is 23.13 kg/m².  IBW: 59 kg    Wt Readings from Last 6 Encounters:   07/14/24 65 kg (143 lb 4.8 oz)   10/29/23 65 kg (143 lb 4.8 oz)   10/25/23 63.5 kg (139 lb 15.9 oz)   03/27/22 63.5 kg (140 lb) (69%, Z= 0.50)*   02/15/22 68.9 kg (151 lb 14.4 oz) (82%, Z= 0.91)*   01/21/22 68.9 kg (152 lb) (82%, Z= 0.92)*     * Growth percentiles are based on [facility] (Girls, 2-20 Years) data.        Labs/Meds reviewed  -Glu:83  -IVF:NS at 125 ml/hr, IV abx, Zofran, Dilaudid    Diet:       Procedures    NPO     Percent Meals Eaten (last 3 days)       Date/Time Percent Meals Eaten (%)    02/21/25 0852 0 %     Percent Meals Eaten (%): NPO at 02/21/25 0852    02/21/25 1128 0 %     Percent Meals Eaten (%): NPO at 02/21/25 1128          Pt chart reviewed d/t MST score of 3.    Pt currently NPO for OR today w/ Urology. Found to have L-sided obstructing ureteral calculi (8 mm proximal calculi, 5 mm distal calculi).   Will monitor for diet advancement and offer ONS at f/u visit, as needed.    Nursing notes reports Percent Meals Eaten (%): 0 % (NPO) intake for last meal. Last BM:2/18. Skin intact.   No extensive wt hx per EMR hx.    PMH: ADHD, anxiety, anorexia, dysautonomia, OCD.    ALLERGIES: iron    Patient is at low nutrition risk at this time.    Please consult if patient status changes or nutrition issues arise.    Cassi Vaughn MS, RD, LDN  Clinical Dietitian  Ext:[phone]

## 2025-02-21 NOTE — CONSULTS
[facility] CONSULT NOTE     Vicki Chavez Patient Status:  Observation    2000 MRN [id number]   Formerly [facility] 3SW-A Attending Morgan Barrow,    Hosp Day # 0 PCP Vicki Alcocer MD     Reason for Consultation:  Left  renal colic - failed  trial of passage     History of Present Illness:  Vicki Chavez is a a(n) 24 year old female. 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    waiting on genetic testing confirmation     Past Surgical History:   Procedure Laterality Date    Appendectomy  2017    Endoscopy of bowel pouch      EGD    Other surgical history      epidurals    Other surgical history      spinal stimulatro x 2    Other surgical history      blood patches     Family History   Problem Relation Age of Onset    High Blood Pressure Mother         hx    Other (Reactive Airway Disease) Mother     High Cholesterol Maternal Grandmother     Diabetes Maternal Grandfather     High Blood Pressure Maternal Grandfather     Prostate Cancer Maternal Grandfather     High Cholesterol Maternal Grandfather     Diabetes Paternal Grandmother     Heart Attack Paternal Grandfather       reports that she has never smoked. She has never used smokeless tobacco. She reports current alcohol use. She reports that she does not use drugs.    Allergies:  Allergies[1]    Medications:    Current Facility-Administered Medications:     sodium chloride 0.9% infusion, , Intravenous, Continuous    ondansetron (Zofran) 4 MG/2ML injection 4 mg, 4 mg, Intravenous, Q6H PRN    ketorolac (Toradol) 15 MG/ML injection  15 mg, 15 mg, Intravenous, Q6H PRN    HYDROmorphone (Dilaudid) 1 MG/ML injection 0.4 mg, 0.4 mg, Intravenous, Q4H PRN    tamsulosin (Flomax) cap 0.4 mg, 0.4 mg, Oral, Daily    albuterol (Ventolin HFA) 108 (90 Base) MCG/ACT inhaler 1 puff, 1 puff, Inhalation, Q4H PRN    ALPRAZolam (Xanax) tab 0.5 mg, 0.5 mg, Oral, Daily PRN    busPIRone (Buspar) tab 20 mg, 20 mg, Oral, BID    [START ON 2/21/2025] carvedilol (Coreg) tab 12.5 mg, 12.5 mg, Oral, Before breakfast    [START ON 2/21/2025] carvedilol (Coreg) tab 6.25 mg, 6.25 mg, Oral, Daily with dinner    fluvoxaMINE (Luvox) tab 100 mg, 100 mg, Oral, BID    gabapentin (Neurontin) cap 400 mg, 400 mg, Oral, Nightly    hydrOXYzine (Atarax) tab 25 mg, 25 mg, Oral, Q8H PRN    ivabradine (Corlanor) tab 5 mg, 5 mg, Oral, BID    [START ON 2/21/2025] lamoTRIgine (LaMICtal) tab 200 mg, 200 mg, Oral, QAM    fluticasone-salmeterol (Advair Diskus) 500-50 MCG/ACT inhaler 1 puff, 1 puff, Inhalation, BID    [START ON 2/21/2025] pantoprazole (Protonix) DR tab 20 mg, 20 mg, Oral, QAM AC    risperiDONE (RisperDAL) tab 1.5 mg, 1.5 mg, Oral, Nightly    traZODone (Desyrel) tab 100 mg, 100 mg, Oral, Nightly    methocarbamol (Robaxin) tab 750 mg, 750 mg, Oral, BID PRN    famotidine (Pepcid) tab 20 mg, 20 mg, Oral, BID    docusate sodium (Colace) cap 100 mg, 100 mg, Oral, BID    cetirizine (ZyrTEC) tab 10 mg, 10 mg, Oral, Daily    diphenhydrAMINE (Benadryl) cap/tab 50 mg, 50 mg, Oral, Nightly PRN    metoclopramide (Reglan) tab 5 mg, 5 mg, Oral, Q6H PRN    influenza virus trivalent PF (Fluzone Trivalent) 0.5 mL IM injection (ages 6 months to 64 years) 0.5 mL, 0.5 mL, Intramuscular, Prior to discharge    butalbital-acetaminophen-caffeine (Fioricet) -40 MG per tab 1 tablet, 1 tablet, Oral, Daily PRN **AND** codeine tab 30 mg, 30 mg, Oral, Daily PRN    Review of Systems:  Pertinent items are noted in HPI.    Physical Exam:  BP (!) 134/93 (BP Location: Left arm)   Pulse 97   Temp 97.9 °F (36.6  °C) (Oral)   Resp 16   Ht 5' 6\" (1.676 m)   LMP 01/09/2025 (Exact Date)   SpO2 90%   BMI 23.13 kg/m²   GENERAL: well developed, well nourished, no apparent distress  EYES: sclera non-icteric, no redness   MOUTH:  moist oral mucosa, no lesions  LUNGS: normal respiratory motion without distress  GI: Abdomen soft without organomegally, no tenderness, normal bowel sounds, No CVAT     NEURO: Alert and Oriented, motor and sensory grossly non-focal   LYMPH:  No appreciable axillary, neck, or inguinal  Adenopathy  SKIN: No rashes, no discoloration  EXTREMITIES: No edema or cyanosis, no calf pain, no appreciable joint deformities     Laboratory Data:  Lab Results   Component Value Date    WBC 8.8 02/20/2025    HGB 9.1 02/20/2025    HCT 29.0 02/20/2025    .0 02/20/2025    CREATSERUM 0.95 02/20/2025    BUN 10 02/20/2025     02/20/2025    K 4.4 02/20/2025     02/20/2025    CO2 28.0 02/20/2025    GLU 83 02/20/2025    CA 9.9 02/20/2025     Lab Results   Component Value Date    COLORUR Yellow 02/20/2025    CLARITY Turbid 02/20/2025    SPECGRAVITY 1.020 02/20/2025    GLUUR Normal 02/20/2025    BILUR Negative 02/20/2025    KETUR Negative 02/20/2025    BLOODURINE Negative 02/20/2025    PHURINE 6.5 02/20/2025    PROUR Negative 02/20/2025    UROBILINOGEN Normal 02/20/2025    NITRITE Negative 02/20/2025    LEUUR 75 02/20/2025     No results found for: \"PSA\"      Intake/Output Summary (Last 24 hours) at 2/20/2025 2306  Last data filed at 2/20/2025 1851  Gross per 24 hour   Intake 500 ml   Output --   Net 500 ml         Imaging:    CT personal review -- tiny  2 mmm stone in K  and then 2  stones  5 and 8 mm in the LEFT proximal ureter    Impression and Plan:  Patient Active Problem List   Diagnosis    Anxiety disorder, unspecified type    Sleep disorder    Chronic abdominal pain    Blepharitis    OCD (obsessive compulsive disorder)    Cierra's disease of left foot    Sever's disease    Myopia, bilateral    Complex  regional pain syndrome type 1 of left lower extremity    Bilateral leg numbness    Conversion disorder    Extrinsic asthma, unspecified    Migraines    Dysautonomia (HCC)    Allergy    Depression    Neurological dysfunction    Iron deficiency    Reflex sympathetic dystrophy    Abnormal EKG    Sinus tachycardia    Anorexia    Ureterolithiasis    Intractable pain    Obstructive uropathy        L renal colic    L proximal ureteral stones  5 and 8 mm    NPO after MN     Plan for URS and HO laser of stones    All risks, benefits and alternatives to surgery were discusssed in detail with the patient. Complications such as urosepsis, bleeding, infection, hematuria, urinary retention, clot retention, and the rare risk of urethral, bladder and/ or ureteral injury were detailed to the patient. The patient was given ample time to ask questions. All questions were answered.  After weighing the risks, benefits and alternatives, patient elects to proceed with surgery as planned.       UA - urine looks dirty - may be a collection issue -- will error  on caution -- Ceftriaxone     Thank you for allowing me to participate in the care of your patient.    Please give me a call on my cell if you have any questions of concerns.     Douglas Anderson MD   [facility]  Department of Urology  Cell: [phone]  Office :[phone]        2/20/2025  11:06 PM        [1]   Allergies  Allergen Reactions    Gadolinium HIVES, RASH and OTHER (SEE COMMENTS)    Iron ANAPHYLAXIS and OTHER (SEE COMMENTS)    Iron Dextran ANAPHYLAXIS     Pt. Reacted to test dose       Radiology Contrast Iodinated Dyes ANAPHYLAXIS, HIVES, RASH and OTHER (SEE COMMENTS)     MRI contrast  1/27/25 pt will pre-medicate prior to first ever CT w/ contrast    Adhesive Tape RASH and ITCHING    Drug [Skin Adhesives] RASH

## 2025-02-21 NOTE — PROGRESS NOTES
[facility] Hospitalist Progress Note     CC: Hospital Follow up    PCP: Vicki Alcocer MD       Subjective:     OR with urology today.  Still endorsing flank pain.    OBJECTIVE:    Blood pressure 125/76, pulse 85, temperature 98.1 °F (36.7 °C), temperature source Oral, resp. rate 16, height 5' 6\" (1.676 m), last menstrual period 01/09/2025, SpO2 95%, not currently breastfeeding.    Temp:  [97.9 °F (36.6 °C)-98.8 °F (37.1 °C)] 98.1 °F (36.7 °C)  Pulse:  [] 85  Resp:  [16-18] 16  BP: (119-141)/(71-98) 125/76  SpO2:  [90 %-99 %] 95 %      Intake/Output:    Intake/Output Summary (Last 24 hours) at 2/21/2025 1337  Last data filed at 2/21/2025 1325  Gross per 24 hour   Intake 500 ml   Output 1100 ml   Net -600 ml       Last 3 Weights   07/14/24 1419 143 lb 4.8 oz (65 kg)   10/29/23 1734 143 lb 4.8 oz (65 kg)   10/25/23 2211 139 lb 15.9 oz (63.5 kg)       Exam   Gen: Alert, no acute distress  Heent: Normocephalic, atraumatic, neck supple, EOMI, PERRLA  Pulm: Lungs CTAB, normal respiratory effort  CV:  Regular rate and rhythm, no murmurs/rubs/gallops  Abd: Soft, nontender, nondistended, bowel sounds present. L CVA tenderness noted.   Extremities: No peripheral edema, no clubbing, pulses intact   Skin: No rashes or lesions  Neuro: AOx3, no focal neurologic deficits, CN II-XII grossly intact  Psych: appropriate mood and affect      Data Review:       Labs:     Recent Labs   Lab 02/17/25  0028 02/20/25  1726 02/21/25  0510   RBC 4.10 3.89 3.42*   HGB 9.7* 9.1* 8.0*   HCT 31.4* 29.0* 26.7*   MCV 76.6* 74.6* 78.1*   MCH 23.7* 23.4* 23.4*   MCHC 30.9* 31.4 30.0*   RDW 19.1 18.8 18.9   NEPRELIM 11.67* 5.52 4.29   WBC 15.1* 8.8 7.6   .0* 407.0 363.0         Recent Labs   Lab 02/17/25  0028 02/20/25  1726 02/21/25  0510   * 83 83   BUN 10 10 10   CREATSERUM 1.14* 0.95 0.95   EGFRCR 70 87 87   CA 9.9 9.9 9.1    140 140   K 4.3 4.4 3.9    103 106   CO2 22.0 28.0 26.0       Recent Labs   Lab  02/17/25  0028   ALT 35   AST 22   ALB 4.7         Imaging:  XR ABDOMEN (1 VIEW) (CPT=74018)    Result Date: 2/20/2025  CONCLUSION:  There are 2 stable appearing left mid ureteral calculi at the L2 and L3 level when compared to the CT scan of 2/17/2025.  The proximal calculus measures 8 mm the slightly more distal calculus measures 5 mm.  No bladder calculus noted.   There is a moderate amount of stool seen throughout the colon without bowel obstruction.  Neurostimulator leads are identified projected over the lower dorsal spine.  The battery pack projects over the left mid abdomen.    LOCATION:  [place] [id number]   Dictated by (CST): Devyn Badillo MD on 2/20/2025 at 6:31 PM     Finalized by (CST): Devyn Badillo MD on 2/20/2025 at 6:36 PM          Meds:      tamsulosin  0.4 mg Oral Daily    busPIRone  20 mg Oral BID    carvedilol  12.5 mg Oral Before breakfast    carvedilol  6.25 mg Oral Daily with dinner    gabapentin  400 mg Oral Nightly    ivabradine  5 mg Oral BID    lamoTRIgine  200 mg Oral QAM    fluticasone-salmeterol  1 puff Inhalation BID    pantoprazole  20 mg Oral QAM AC    risperiDONE  1.5 mg Oral Nightly    traZODone  100 mg Oral Nightly    famotidine  20 mg Oral BID    docusate sodium  100 mg Oral BID    cetirizine  10 mg Oral Daily    cefTRIAXone  1 g Intravenous Q24H    fluvoxaMINE  200 mg Oral BID    gabapentin  300 mg Oral Daily      sodium chloride 125 mL/hr at 02/21/25 0912       prochlorperazine    ondansetron    ketorolac    HYDROmorphone    albuterol    ALPRAZolam    hydrOXYzine    methocarbamol    diphenhydrAMINE    influenza virus vaccine PF    butalbital-acetaminophen-caffeine **AND** codeine       Assessment/Plan:     24 year old female with PMH of ADHD, anxiety, anorexia, dysautonomia, migraines, OCD who is presenting to the hospital due to flank pain.      Left-sided obstructing ureteral calculi  -8 mm proximal calculi, 5 mm distal calculi visualized on x-ray, recent CT imaging showed  obstructing kidney stone and patient was supposed to have outpatient procedure with urology.  Presenting now with worsening pain.  -IV fluids  -N.p.o.  -Pain/nausea control  -Flomax  -Urology consulted, OR today  -IV Rocephin     Anxiety  OCD  ADHD  -Continue home meds     Dysautonomia   -Continue home coreg, ivabardine     Dispo: OR today     Outpatient records reviewed. Questions/concerns were discussed with patient and/or family by bedside.   A total of 52 minutes were taken with patient and coordinating care.     DO Whitley Barrow [facility] Center[facility]  Contact via [employer]/SmartMenuCard    Supplementary Documentation:   DVT Mechanical Prophylaxis:        DVT Pharmacologic Prophylaxis   Medication   None                Code Status: Full Code  Gomez: No urinary catheter in place  Gomez Duration (in days):   Central line:    FANNIE: 2/22/2025

## 2025-02-21 NOTE — ANESTHESIA PROCEDURE NOTES
Airway  Date/Time: 2/21/2025 5:10 PM  Urgency: elective    Airway not difficult    General Information and Staff    Patient location during procedure: OR  Anesthesiologist: Albert Ortega MD  Performed: anesthesiologist   Performed by: Albert Ortega MD  Authorized by: Albert Ortega MD      Indications and Patient Condition  Indications for airway management: anesthesia  Sedation level: deep  Preoxygenated: yes  Patient position: sniffing  Mask difficulty assessment: 1 - vent by mask  Planned trial extubation    Final Airway Details  Final airway type: supraglottic airway      Successful airway: classic  Size 3       Number of attempts at approach: 1

## 2025-02-21 NOTE — OPERATIVE REPORT
OPERATIVE NOTE    PATIENT NAME: Vicki Chavez  YOB: 2000  DATE OF SERVICE: 2/21/2025    SURGEON:  Ivan Wolf MD    ASSISTANT:  None    PREOPERATIVE DIAGNOSIS:  Left ureteral calculi with hydronephrosis, left renal calculi    POSTOPERATIVE DIAGNOSIS:  Same    PROCEDURE PERFORMED:  Cystoscopy  Left Retrograde Pyelogram  Left Ureteroscopy with Laser Lithotripsy and Stone Basket Extraction  Left Ureteral Stent Placement  Intraoperative interpretation of fluoroscopic images    ASSISTANTS:  None    ANESTHESIA:  General    ANTIBIOTIC PROPHYLAXIS:  Ancef    SPECIMENS:  Left ureteral calculus    DRAINS:  6F x 24 cm JJ left ureteral stent    ESTIMATED BLOOD LOSS:  Minimal    COMPLICATIONS:  No immediate complications     INDICATIONS FOR PROCEDURE:  Ms. Chavez is a 24 year old woman who was recently diagnosed with two left ureteral calculi with hydronephrosis and left renal calculi. She was admitted for pain control. She has elected to proceed with a ureteroscopy and laser lithotripsy today.     We discussed the risks of the procedure including bleeding, infection, or damage to the urologic structures.  The patient understands that in ~10% of patients, the ureter is too narrow to accommodate a ureteroscope.  If this occurs, a ureteral stent will be placed and the patient will need to return to the OR in a delayed fashion for definitive stone treatment after the stent has allowed passive dilation of the ureter to occur.  Furthermore, they understand that ureteral stents can cause flank pain and/or urinary symptoms.     DESCRIPTION OF PROCEDURE:  After reviewing the indications for the procedure, informed consent was reviewed and signed by the patient.    The patient was brought to the operating room and placed in the supine position on the OR table.  SCD's were applied and all pressure points were carefully padded.  At this point, anesthesia was successfully induced.  The patient was then given the  perioperative antibiotics listed above prior to the procedure.  The patient was transferred to the dorsal lithotomy position and prepped and draped in the normal sterile fashion using betadine.  We then performed an operative time out to confirm the correct patient, procedure, and laterality.  Everyone in the room was in agreement.     I began by inserting a 22F rigid cystoscope into the bladder per urethra.  The urethra was without lesions or strictures.  Upon entering the bladder I performed a thorough cystoscopy which did not reveal any bladder lesions, stones, or other pathology.  I identified the bilateral ureteral orifices in their orthotopic locations.      I first began by performing a left retrograde pyelogram using a 5F ureteral catheter and contrast dye.  There was a left proximal ureteral filling defect, consistent with her known calculus. There was retained contrast noted in the kidney.      I next cannulated the left ureteral orifice using a 0.035 wire. I followed this up into the renal pelvis under fluoroscopic guidance.      I then inserted an 11/13 x 28cm ureteral access sheath into the ureter under fluoroscopic guidance.  I then inserted a flexible ureteroscope and inspected the proximal ureter. I identified an obstructing calculus. I used a 365 micron laser to fragment this stone into fine debris.  The debris was flushed down the access sheath. I identified a second stone more proximally and it was fragmented in a similar fashion. In doing this, some of the stone migrated into the renal pelvis and a mid pole calyx. I further fragmented the stone in the calyx into fine debris. A few punctate calculi were identified in the kidney and were fragmented in a similar fashion. I used a 0 tip basket to extract all sizeable fragments.  They were sent to pathology as \"left ureteral calculus.\"  I inspected the remainder of the kidney, and no additional stones or sizeable fragments were identified.     I then  backed the scope and sheath down the ureter, to ensure no migration of ureteral stone fragments. I replaced a 0.035 wire. I reinserted a cystoscope and over the wire advanced a 6F x 24 cm JJ left ureteral stent. The stent was deployed with a good proximal curl on fluoroscopy, and a good distal curl visually within the bladder lumen.     I then emptied the bladder of any stone debris and irrigant. 2% viscous lidocaine was instilled into the urethra for post-operative analgesia.    This completed the procedure.  They tolerated it well without complications.    Please note that I was present for, and completed the entirety of this operation myself.    PLAN:  Admit to overnight observation. Tentative plan for discharge to home tomorrow AM. She will need to follow up in 7-10 days post-operatively for stent removal.       Ivan Wolf MD  [facility]  Department of Urology  Office: [phone]

## 2025-02-21 NOTE — H&P
[facility] Hospitalist H&P       CC:   Chief Complaint   Patient presents with    Flank Pain        PCP: Vicki Alcocer MD    History of Present Illness: Patient is a 24 year old female with PMH of ADHD, anxiety, anorexia, dysautonomia, migraines, OCD who is presenting to the hospital due to flank pain.  Patient was diagnosed with left-sided 8 mm kidney stone last Sunday and has been having worsening flank pain.  She was supposed to have outpatient procedure with urology next week, instructed to come into the ER if she had worsening symptoms.    On arrival, BP mildly elevated but vitals otherwise unremarkable.  Renal function stable, hemoglobin 9.1 (baseline around 10), urinalysis shows 75 leukocyte esterase, 21-50 WBCs, 2+ bacteria. X-ray of the abdomen shows 2 stable appearing left mid ureteral calculi at L2/L3 levels.  Proximal calculi measures 8 mm, distal calculi measures 5 mm.  Admitted to the hospital with urology on consult.    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    waiting on genetic testing confirmation        PSH  Past Surgical History:   Procedure Laterality Date    Appendectomy  2017    Endoscopy of bowel pouch  2012    EGD    Other surgical history      epidurals    Other surgical history  2020    spinal stimulatro x 2    Other surgical history  2021    blood patches        ALL:  Allergies[1]     Home Medications:  Medications Taking[2]      Soc Hx  Social History     Tobacco Use    Smoking status: Never    Smokeless tobacco: Never   Substance Use Topics    Alcohol use: Yes     Comment: social        Fam Hx  Family History   Problem Relation Age of Onset    High Blood Pressure Mother         hx     Other (Reactive Airway Disease) Mother     High Cholesterol Maternal Grandmother     Diabetes Maternal Grandfather     High Blood Pressure Maternal Grandfather     Prostate Cancer Maternal Grandfather     High Cholesterol Maternal Grandfather     Diabetes Paternal Grandmother     Heart Attack Paternal Grandfather        Review of Systems  Comprehensive ROS reviewed and negative except for what's stated above.     OBJECTIVE:  BP (!) 141/98   Pulse 85   Temp 98.5 °F (36.9 °C) (Temporal)   Resp 16   Ht 5' 6\" (1.676 m)   LMP 01/09/2025 (Exact Date)   SpO2 98%   BMI 23.13 kg/m²     Gen: Alert, no acute distress  Heent: Normocephalic, atraumatic, neck supple, EOMI, PERRLA  Pulm: Lungs CTAB, normal respiratory effort  CV:  Regular rate and rhythm, no murmurs/rubs/gallops  Abd: Soft, nontender, nondistended, bowel sounds present. L CVA tenderness noted.   Extremities: No peripheral edema, no clubbing, pulses intact   Skin: No rashes or lesions  Neuro: AOx3, no focal neurologic deficits, CN II-XII grossly intact  Psych: appropriate mood and affect    Diagnostic Data:    CBC/Chem  Recent Labs   Lab 02/17/25  0028 02/20/25  1726   WBC 15.1* 8.8   HGB 9.7* 9.1*   MCV 76.6* 74.6*   .0* 407.0       Recent Labs   Lab 02/17/25  0028 02/20/25  1726    140   K 4.3 4.4    103   CO2 22.0 28.0   BUN 10 10   CREATSERUM 1.14* 0.95   * 83   CA 9.9 9.9       Recent Labs   Lab 02/17/25  0028   ALT 35   AST 22   ALB 4.7       No results for input(s): \"TROP\" in the last 168 hours.    Radiology: XR ABDOMEN (1 VIEW) (CPT=74018)    Result Date: 2/20/2025  CONCLUSION:  There are 2 stable appearing left mid ureteral calculi at the L2 and L3 level when compared to the CT scan of 2/17/2025.  The proximal calculus measures 8 mm the slightly more distal calculus measures 5 mm.  No bladder calculus noted.   There is a moderate amount of stool seen throughout the colon without bowel obstruction.  Neurostimulator leads are  identified projected over the lower dorsal spine.  The battery pack projects over the left mid abdomen.    LOCATION:  [id number]   Dictated by (CST): Devyn Badillo MD on 2/20/2025 at 6:31 PM     Finalized by (CST): Devyn Badillo MD on 2/20/2025 at 6:36 PM          ASSESSMENT / PLAN:     24 year old female with PMH of ADHD, anxiety, anorexia, dysautonomia, migraines, OCD who is presenting to the hospital due to flank pain.     Left-sided obstructing ureteral calculi  -8 mm proximal calculi, 5 mm distal calculi visualized on x-ray, recent CT imaging showed obstructing kidney stone and patient was supposed to have outpatient procedure with urology.  Presenting now with worsening pain.  -IV fluids  -N.p.o. after midnight   -Pain/nausea control  -Flomax  -Urology consulted, appreciate any further recommendations    Anxiety  OCD  ADHD  -Continue home meds    Dysautonomia   -Continue home coreg, ivabardine     Dispo: Inpatient    Outpatient or previous hospital records reviewed. Questions/concerns were discussed with patient and/or family by bedside.   A total of 77 minutes were taken with patient and coordinating care.     Morgan Barrwo [facility]ist  Contact via [email or web address]                                        Advanced Care Planning    While discussing goals of care with the patient and their family, patient voluntarily participated in an advanced care planning discussion. The following was discussed: patient is full code.    16 minutes were spent in discussing advanced care planning. This time was exclusive of the documented time for this visit.      Supplementary Documentation:   DVT Mechanical Prophylaxis:        DVT Pharmacologic Prophylaxis   Medication   None                Code Status: Full Code  Gomez: No urinary catheter in place  Gomez Duration (in days):   Central line:    FANNIE:                            [1]   Allergies  Allergen Reactions    Gadolinium HIVES, RASH  and OTHER (SEE COMMENTS)    Iron ANAPHYLAXIS and OTHER (SEE COMMENTS)    Iron Dextran ANAPHYLAXIS     Pt. Reacted to test dose       Radiology Contrast Iodinated Dyes ANAPHYLAXIS, HIVES, RASH and OTHER (SEE COMMENTS)     MRI contrast  1/27/25 pt will pre-medicate prior to first ever CT w/ contrast    Adhesive Tape RASH and ITCHING    Drug [Skin Adhesives] RASH   [2]   No outpatient medications have been marked as taking for the 2/20/25 encounter (Hospital Encounter).

## 2025-02-21 NOTE — PROGRESS NOTES
[facility]   part of [facility]    Progress Note    Vicki Chavez Patient Status:  Observation    2000 MRN [id number]   Location [facility] 3SW-A Attending Morgan Barrow,    Hosp Day # 0 PCP Vicki Alcocer MD     Subjective:   Vicki Chavez is a(n) 24 year old female scheduled for ureteroscopy later today due to obstructing stones. Is NPO  Two proximal ureteral stones  5 and 8 mm on the LEFT  States VAS is 8/10 when not on pain meds    Objective:   Blood pressure 123/84, pulse 87, temperature 98 °F (36.7 °C), temperature source Oral, resp. rate 16, height 5' 6\" (1.676 m), last menstrual period 2025, SpO2 96%, not currently breastfeeding.    General appearance:  alert, appears stated age, and cooperative, mild distress  Abdominal: soft, non-tender; bowel sounds normal; no masses,  no organomegaly, Left CVAT    Results:   Lab Results   Component Value Date    WBC 7.6 2025    HGB 8.0 (L) 2025    HCT 26.7 (L) 2025    .0 2025    CREATSERUM 0.95 2025    BUN 10 2025     2025    K 3.9 2025     2025    CO2 26.0 2025    GLU 83 2025    CA 9.1 2025    ALB 4.7 2025    ALKPHO 92 2025    BILT <0.2 (L) 2025    TP 8.2 2025    AST 22 2025    ALT 35 2025    T4F 0.9 2024    TSH 1.55 2024    DASHA 34 2021     2018    DDIMER 0.48 2022    ESRML 29 (H) 2021    CRP 1.08 (H) 2021    MG 1.8 2025    PHOS 3.5 2022    TROPHS 4 2022    CK 51 2021    ETOH <3 2021       XR ABDOMEN (1 VIEW) (CPT=74018)    Result Date: 2025  CONCLUSION:  There are 2 stable appearing left mid ureteral calculi at the L2 and L3 level when compared to the CT scan of 2025.  The proximal calculus measures 8 mm the slightly more distal calculus measures 5 mm.  No bladder calculus noted.   There is a moderate amount of  stool seen throughout the colon without bowel obstruction.  Neurostimulator leads are identified projected over the lower dorsal spine.  The battery pack projects over the left mid abdomen.    LOCATION:  [place] [id number]   Dictated by (CST): Devyn Badillo MD on 2/20/2025 at 6:31 PM     Finalized by (CST): Devyn Badillo MD on 2/20/2025 at 6:36 PM            Assessment & Plan:     Ureterolithiasis    Intractable pain    Obstructive uropathy    To OR today- ureteroscopy, lithotripsy, possible stent  NPO  Consent        Melodie Becerra PA-C  2/21/2025

## 2025-02-21 NOTE — ED QUICK NOTES
Orders for admission, patient is aware of plan and ready to go upstairs. Any questions, please call ED RN Alfreda at extension [phone].     Patient Covid vaccination status: Fully vaccinated     COVID Test Ordered in ED: None    COVID Suspicion at Admission: N/A    Running Infusions:    sodium chloride          Mental Status/LOC at time of transport: a/o x4    Other pertinent information:   CIWA score: N/A   NIH score:  N/A

## 2025-02-22 VITALS
OXYGEN SATURATION: 100 % | TEMPERATURE: 99 F | SYSTOLIC BLOOD PRESSURE: 130 MMHG | HEIGHT: 66 IN | HEART RATE: 69 BPM | DIASTOLIC BLOOD PRESSURE: 86 MMHG | RESPIRATION RATE: 17 BRPM | BODY MASS INDEX: 23 KG/M2

## 2025-02-22 LAB
ALBUMIN SERPL-MCNC: 4.6 G/DL (ref 3.2–4.8)
ANION GAP SERPL CALC-SCNC: 11 MMOL/L (ref 0–18)
BUN BLD-MCNC: 9 MG/DL (ref 9–23)
CALCIUM BLD-MCNC: 9.9 MG/DL (ref 8.7–10.6)
CHLORIDE SERPL-SCNC: 104 MMOL/L (ref 98–112)
CO2 SERPL-SCNC: 24 MMOL/L (ref 21–32)
CREAT BLD-MCNC: 0.95 MG/DL
EGFRCR SERPLBLD CKD-EPI 2021: 87 ML/MIN/1.73M2 (ref 60–?)
ERYTHROCYTE [DISTWIDTH] IN BLOOD BY AUTOMATED COUNT: 18.4 %
GLUCOSE BLD-MCNC: 134 MG/DL (ref 70–99)
HCT VFR BLD AUTO: 30.2 %
HGB BLD-MCNC: 9.4 G/DL
MAGNESIUM SERPL-MCNC: 1.9 MG/DL (ref 1.6–2.6)
MCH RBC QN AUTO: 23.9 PG (ref 26–34)
MCHC RBC AUTO-ENTMCNC: 31.1 G/DL (ref 31–37)
MCV RBC AUTO: 76.6 FL
OSMOLALITY SERPL CALC.SUM OF ELEC: 289 MOSM/KG (ref 275–295)
PHOSPHATE SERPL-MCNC: 3.5 MG/DL (ref 2.4–5.1)
PLATELET # BLD AUTO: 414 10(3)UL (ref 150–450)
POTASSIUM SERPL-SCNC: 4.8 MMOL/L (ref 3.5–5.1)
RBC # BLD AUTO: 3.94 X10(6)UL
SODIUM SERPL-SCNC: 139 MMOL/L (ref 136–145)
WBC # BLD AUTO: 8.2 X10(3) UL (ref 4–11)

## 2025-02-22 PROCEDURE — 90471 IMMUNIZATION ADMIN: CPT

## 2025-02-22 PROCEDURE — 83735 ASSAY OF MAGNESIUM: CPT | Performed by: UROLOGY

## 2025-02-22 PROCEDURE — 80069 RENAL FUNCTION PANEL: CPT | Performed by: UROLOGY

## 2025-02-22 PROCEDURE — 85027 COMPLETE CBC AUTOMATED: CPT | Performed by: UROLOGY

## 2025-02-22 RX ORDER — OXYCODONE HYDROCHLORIDE 5 MG/1
5 TABLET ORAL ONCE
Status: COMPLETED | OUTPATIENT
Start: 2025-02-22 | End: 2025-02-22

## 2025-02-22 RX ORDER — OXYCODONE HYDROCHLORIDE 5 MG/1
5 TABLET ORAL EVERY 4 HOURS PRN
Qty: 10 TABLET | Refills: 0 | Status: SHIPPED | OUTPATIENT
Start: 2025-02-22

## 2025-02-22 RX ORDER — ONDANSETRON 4 MG/1
4 TABLET, FILM COATED ORAL EVERY 8 HOURS PRN
Qty: 10 TABLET | Refills: 0 | Status: SHIPPED | OUTPATIENT
Start: 2025-02-22

## 2025-02-22 NOTE — DISCHARGE INSTRUCTIONS
Recommendations to Help Prevent Kidney Stones     1. Drink enough water to produce 2-3 liters of clear urine daily. You may need to use a container at first to measure how much you are actually producing and increase your intake accordingly. Ask the office staff if you would like a collection container.  Try to start the day off with a large glass of water, as we all wake up dehydrated in the morning.  Increasing  the amount of fluids that you drink is the #1 thing that patients can do to help prevent future kidney stone formation, or growth of current stones.     2. Add lemon or lime juice to your water a few times a day. These juices contain citrate which naturally inhibits stone formation. An easy way to do this is using sugar free lemonade mix (ie Crystal Light or True Lemon (if you prefer to avoid aspartame)).     3. Avoid salty foods such as prepackaged and fast foods, and do not add salt to foods.     4. Limit intake of the following group of foods to one serving daily: spinach, tea, chocolate, potatoes, and nuts.  This is particularly true for patients who form calcium oxalate stones.  Ask your urologist if you form these types of stones.     5. Limit intake of Vitamin C supplements to less than 1000 mg daily.     6. Limit intake of animal protein (beef, chicken, turkey, fish, pork) to one serving daily.     7. Maintain 1-2 servings of dairy products daily. Try to decrease cheese intake as it may increase kidney stone risk compared to other dairy products. Do not eliminate calcium from your diet as it is necessary for bone health.     8. Eat a low fat diet and exercise at least 30 minutes 3 times per week to try and maintain your ideal body weight. Being overweight is a risk factor for kidney stones too!    9. For patients with diabetes, careful control of your blood glucose (sugar) levels can be helpful in preventing certain types of stones.    Unfortunately, even with these dietary changes, some patients  will continue to form stones.  For patients who form multiple stones over their lifetime, there is additional testing your urologist can perform to help determine the exact reasons you form stones.  In some cases, medications can be used to help further prevent future stones from forming. Ask your urologist if they feel this testing is needed.    A good in-depth reference for the diagnosis, treatment, and prevention of kidney stones can be found at: http://[email or web address]/Documents/[email or web address]/Stones_PatientGuide-web.pdf    Please call the office at [phone] with any questions regarding these recommendations.

## 2025-02-22 NOTE — PROGRESS NOTES
[facility]   part of [facility]    Progress Note    Vicki Chavez Patient Status:  Observation    2000 MRN [id number]   Location [facility] 3SW-A Attending Morgan Barrow,    Hosp Day # 0 PCP Vicki Alcocer MD     Subjective:   Vciki Chavez is a(n) 24 year old female scheduled for ureteroscopy later today due to obstructing stones. Is NPO    She underwent left URS/LL yesterday. Stones cleared. A stent was placed. Some stent-related symptoms this AM. Labs reviewed.     Objective:   Blood pressure 141/85, pulse 57, temperature 98.1 °F (36.7 °C), temperature source Oral, resp. rate 16, height 5' 6\" (1.676 m), last menstrual period 2025, SpO2 92%, not currently breastfeeding.    General appearance:  alert, appears stated age, and cooperative, mild distress  Abdominal: soft, non-tender; bowel sounds normal; no masses,  no organomegaly, Left CVAT    Results:   Lab Results   Component Value Date    WBC 8.2 2025    HGB 9.4 (L) 2025    HCT 30.2 (L) 2025    .0 2025    CREATSERUM 0.95 2025    BUN 9 2025     2025    K 4.8 2025     2025    CO2 24.0 2025     (H) 2025    CA 9.9 2025    ALB 4.6 2025    ALKPHO 92 2025    BILT <0.2 (L) 2025    TP 8.2 2025    AST 22 2025    ALT 35 2025    T4F 0.9 2024    TSH 1.55 2024    DASHA 34 2021     2018    DDIMER 0.48 2022    ESRML 29 (H) 2021    CRP 1.08 (H) 2021    MG 1.9 2025    PHOS 3.5 2025    TROPHS 4 2022    CK 51 2021    ETOH <3 2021       XR OR - N/C    Result Date: 2025  CONCLUSION:  3 fluoroscopic image(s) obtained and submitted during urologic procedure.  No radiologist was present for the exam.  Correlation with real-time fluoroscopy and operative report are recommended.   LOCATION:  [facility]    Dictated by (CST): Anton Cleveland,  MD on 2/21/2025 at 6:24 PM     Finalized by (CST): Anton Cleveland MD on 2/21/2025 at 6:24 PM       XR ABDOMEN (1 VIEW) (CPT=74018)    Result Date: 2/20/2025  CONCLUSION:  There are 2 stable appearing left mid ureteral calculi at the L2 and L3 level when compared to the CT scan of 2/17/2025.  The proximal calculus measures 8 mm the slightly more distal calculus measures 5 mm.  No bladder calculus noted.   There is a moderate amount of stool seen throughout the colon without bowel obstruction.  Neurostimulator leads are identified projected over the lower dorsal spine.  The battery pack projects over the left mid abdomen.    LOCATION:  [id number]   Dictated by (CST): Devyn Badillo MD on 2/20/2025 at 6:31 PM     Finalized by (CST): Devyn Badillo MD on 2/20/2025 at 6:36 PM            Assessment & Plan:     Ureterolithiasis    Intractable pain    Obstructive uropathy    -POD 1 s/p left URS/LL.  Stones were treated and a stent was placed.  Stent to remain in place until follow up with Dr. Ballesteros on Wed 2/26. Patient understands stent is not permanent.   -Patient can take her home tramadol or OTC pain relievers. She asked for a zofran script, which I sent to pharmacy  -Urine culture pending, bladder did not appear suggestive of cystitis at time of cystoscopy.   -She can be discharged to home today from my perspective.     Ivan Wolf MD  [facility]  Department of Urology  Office: [phone]

## 2025-02-22 NOTE — PLAN OF CARE
Pt A&Ox4. VSS on RA. . Resting in bed at this time. Oxycodone given for flank pain. Plan to discharge to home when medically cleared. Call light within reach. Will continue to monitor.

## 2025-02-22 NOTE — DISCHARGE SUMMARY
General Medicine Discharge Summary     Patient ID:  Vicki Chavez  24 year old  6/14/2000    Admit date: 2/20/2025    Discharge date and time: 2/22/2025  1:22 PM     Attending Physician: No att. providers found     Consults: IP CONSULT TO UROLOGY    Primary Care Physician: Vicki Alcocer MD     Reason for admission: kidney stone    Risk For Readmission: low    Discharge Diagnoses: Ureterolithiasis [N20.1]  Obstructive uropathy [N13.9]  Intractable pain [R52]  See Additional Discharge Diagnoses in Hospital Course    Discharged Condition: good    Follow-up with labs/images appointments: PCP, urology    Exam   Gen: Alert, no acute distress  Heent: Normocephalic, atraumatic, neck supple, EOMI, PERRLA  Pulm: Lungs CTAB, normal respiratory effort  CV:  Regular rate and rhythm, no murmurs/rubs/gallops  Abd: Soft, nontender, nondistended, bowel sounds present  Extremities: No peripheral edema, no clubbing, pulses intact   Skin: No rashes or lesions  Neuro: AOx3, no focal neurologic deficits, CN II-XII grossly intact  Psych: appropriate mood and affect    Hospital Course: 24 year old female with PMH of ADHD, anxiety, anorexia, dysautonomia, migraines, OCD who is presenting to the hospital due to flank pain.  Found to have left-sided obstructing ureteral calculi, 8 mm proximal calculi along with 5 mm distal calculi.  Patient underwent URS/LL with urology on 2/21.  Stent in place and patient will follow-up with urology on 2/26 for stent removal.  Otherwise stable for discharge at this time.    Operative Procedures: Procedure(s) (LRB):  CYSTOSCOPY LEFT RETROGRADE PYLOGRAM, LEFT URETEROSCOPY WITH LASER LITHOTRIPSY, INSERTION OF LEFT URETERAL STENT (Left)     Imaging: XR OR - N/C    Result Date: 2/21/2025  CONCLUSION:  3 fluoroscopic image(s) obtained and submitted during urologic procedure.  No radiologist was present for the exam.  Correlation with real-time fluoroscopy and operative report are recommended.   LOCATION:   [facility]    Dictated by (CST): Anton Cleveland MD on 2/21/2025 at 6:24 PM     Finalized by (CST): Anton Cleveland MD on 2/21/2025 at 6:24 PM       XR ABDOMEN (1 VIEW) (CPT=74018)    Result Date: 2/20/2025  CONCLUSION:  There are 2 stable appearing left mid ureteral calculi at the L2 and L3 level when compared to the CT scan of 2/17/2025.  The proximal calculus measures 8 mm the slightly more distal calculus measures 5 mm.  No bladder calculus noted.   There is a moderate amount of stool seen throughout the colon without bowel obstruction.  Neurostimulator leads are identified projected over the lower dorsal spine.  The battery pack projects over the left mid abdomen.    LOCATION:  [id number]   Dictated by (CST): Devyn Badillo MD on 2/20/2025 at 6:31 PM     Finalized by (CST): Devyn Badillo MD on 2/20/2025 at 6:36 PM          Disposition: home      Home Medication Changes:     Med list     Medication List        START taking these medications      oxyCODONE 5 MG Tabs  Take 1 tablet (5 mg total) by mouth every 4 (four) hours as needed for Pain.            CHANGE how you take these medications      albuterol 108 (90 Base) MCG/ACT Aers  Commonly known as: Ventolin HFA  INHALE 1 TO 2 PUFFS BY MOUTH EVERY 4 HOURS 15 MINUTES BEFORE EXERCISE AS NEEDED  What changed:   how much to take  how to take this  when to take this  additional instructions     * ondansetron 8 MG tablet  Commonly known as: Zofran  TAKE 1 TABLET(8 MG) BY MOUTH EVERY 8 HOURS AS NEEDED FOR NAUSEA  What changed: Another medication with the same name was added. Make sure you understand how and when to take each.     * ondansetron 4 mg tablet  Commonly known as: Zofran  Take 1 tablet (4 mg total) by mouth every 8 (eight) hours as needed for Nausea.  What changed: You were already taking a medication with the same name, and this prescription was added. Make sure you understand how and when to take each.           * This list has 2 medication(s) that are  the same as other medications prescribed for you. Read the directions carefully, and ask your doctor or other care provider to review them with you.                CONTINUE taking these medications      ALPRAZolam 0.5 MG Tabs  Commonly known as: Xanax     Atogepant 60 MG Tabs     busPIRone 10 MG Tabs  Commonly known as: Buspar     Butalbital-APAP-Caff-Cod -23-30 MG Caps  Commonly known as: FIORICET WITH CODEINE     * carvedilol 6.25 MG Tabs  Commonly known as: Coreg     * carvedilol 6.25 MG Tabs  Commonly known as: Coreg     Culturelle Caps     CUSTOM MEDICATION  Rg3 2mg /NR 2mg 1 spray each nostril twice a day Disp 1 refill 1     DICLOFENAC OR     diphenhydrAMINE 50 MG Caps  Commonly known as: Benadryl     Drysol 20 % Soln  Generic drug: Aluminum Chloride  Apply to AA underarms QHS     EPINEPHrine 0.3 MG/0.3ML Soaj  Commonly known as: EpiPen     famotidine 20 MG Tabs  Commonly known as: Pepcid     fluvoxaMINE 100 MG Tabs  Commonly known as: Luvox     * gabapentin 400 MG Caps  Commonly known as: Neurontin     * gabapentin 300 MG Caps  Commonly known as: Neurontin     * HYDROcodone-acetaminophen 5-325 MG Tabs  Commonly known as: Norco     * HYDROcodone-acetaminophen 5-325 MG Tabs  Commonly known as: Norco  Take 1 tablet by mouth every 6 (six) hours as needed for Pain. Do not drive or operate machinery within 8 hours of taking this medication     hydrOXYzine 50 MG Tabs  Commonly known as: Atarax     ibuprofen 600 MG Tabs  Commonly known as: Motrin  Take 1 tablet (600 mg total) by mouth every 8 (eight) hours as needed for Pain or Fever.     ivabradine 5 MG Tabs  Commonly known as: Corlanor     lamoTRIgine 200 MG Tabs  Commonly known as: LaMICtal     levocetirizine 5 MG Tabs  Commonly known as: Xyzal     loratadine 10 MG Tabs  Commonly known as: Claritin     Melatonin 10 MG Tabs     metFORMIN  MG Tb24  Commonly known as: Glucophage XR  Take 1 tablet (750 mg total) by mouth daily.     methocarbamol 750 MG  Tabs  Commonly known as: Robaxin  Take 1 tablet (750 mg total) by mouth 2 (two) times daily as needed.     Mometasone Furo-Formoterol Fum 200-5 MCG/ACT Aero     omeprazole 20 MG Cpdr  Commonly known as: PriLOSEC     ondansetron 4 MG Tbdp  Commonly known as: Zofran-ODT  Take 1 tablet (4 mg total) by mouth every 8 (eight) hours as needed for Nausea (vomiting).     phenazopyridine 100 MG Tabs  Commonly known as: Pyridium     Pocket Spacer Tiara  Use with inhaler     Promethazine HCl 12.5 MG Tabs  Commonly known as: PHENERGAN     * risperiDONE 1 MG Tabs  Commonly known as: RisperDAL     * risperiDONE 0.5 MG Tabs  Commonly known as: RisperDAL     Simethicone 250 MG Caps     Slynd 4 MG Tabs  Generic drug: Drospirenone     STOOL SOFTENER OR     tamsulosin 0.4 MG Caps  Commonly known as: Flomax  Take 1 capsule (0.4 mg total) by mouth daily.     traMADol 50 MG Tabs  Commonly known as: Ultram     traZODone 50 MG Tabs  Commonly known as: Desyrel     Viloxazine HCl  MG Cp24     VITAMIN D OR           * This list has 8 medication(s) that are the same as other medications prescribed for you. Read the directions carefully, and ask your doctor or other care provider to review them with you.                   Where to Get Your Medications        These medications were sent to [id number] - [place], [place] - [address] AT [facility] OF [address], [phone], [phone] [address], [facility] [address]      Phone: [phone]   ondansetron 4 mg tablet       You can get these medications from any pharmacy    Bring a paper prescription for each of these medications  oxyCODONE 5 MG Tabs         FU   Follow-up Information       Luciano Ballesteros MD Follow up in 4 day(s).    Specialty: UROLOGY  Contact information:  [address]  [address]  [facility] [address] [phone]                             DC instructions:      Other Discharge Instructions:         Recommendations to Help Prevent Kidney  Stones     1. Drink enough water to produce 2-3 liters of clear urine daily. You may need to use a container at first to measure how much you are actually producing and increase your intake accordingly. Ask the office staff if you would like a collection container.  Try to start the day off with a large glass of water, as we all wake up dehydrated in the morning.  Increasing  the amount of fluids that you drink is the #1 thing that patients can do to help prevent future kidney stone formation, or growth of current stones.     2. Add lemon or lime juice to your water a few times a day. These juices contain citrate which naturally inhibits stone formation. An easy way to do this is using sugar free lemonade mix (ie Crystal Light or True Lemon (if you prefer to avoid aspartame)).     3. Avoid salty foods such as prepackaged and fast foods, and do not add salt to foods.     4. Limit intake of the following group of foods to one serving daily: spinach, tea, chocolate, potatoes, and nuts.  This is particularly true for patients who form calcium oxalate stones.  Ask your urologist if you form these types of stones.     5. Limit intake of Vitamin C supplements to less than 1000 mg daily.     6. Limit intake of animal protein (beef, chicken, turkey, fish, pork) to one serving daily.     7. Maintain 1-2 servings of dairy products daily. Try to decrease cheese intake as it may increase kidney stone risk compared to other dairy products. Do not eliminate calcium from your diet as it is necessary for bone health.     8. Eat a low fat diet and exercise at least 30 minutes 3 times per week to try and maintain your ideal body weight. Being overweight is a risk factor for kidney stones too!    9. For patients with diabetes, careful control of your blood glucose (sugar) levels can be helpful in preventing certain types of stones.    Unfortunately, even with these dietary changes, some patients will continue to form stones.  For  patients who form multiple stones over their lifetime, there is additional testing your urologist can perform to help determine the exact reasons you form stones.  In some cases, medications can be used to help further prevent future stones from forming. Ask your urologist if they feel this testing is needed.    A good in-depth reference for the diagnosis, treatment, and prevention of kidney stones can be found at: [email or web address]    Please call the office at [phone] with any questions regarding these recommendations.          I reconciled current and discharge medications on day of discharge, discussed changes with patient and noted changes above.       Total Time Coordinating Care: 36 minutes.     Patient had opportunity to ask questions and state understanding and agreement with therapeutic plan as outlined.    DO Whitley Barrow [facility]  Contact via [email or web address]

## 2025-02-22 NOTE — PLAN OF CARE
POD#0 cysto, stent placement. AxO4. VSS on RA. Refusing SCDs. Given PRN antiemetics - able to have dinner tonight. Voiding via bathroom - red - tinged urine, denies pain during urination. Reports L flank pain - does report some improvement post proc, given PRN pain meds. On IV rocephin. Monitoring labs. Updated on POC. Safety measures placed. Care ongoing.

## 2025-02-22 NOTE — ANESTHESIA POSTPROCEDURE EVALUATION
[facility]    Vicki Chavez Patient Status:  Observation   Age/Gender 24 year old female MRN [id number]   Location [facility] SURGERY Attending Morgan Barrow DO   [facility] Day # 0 PCP Vicki Alcocer MD       Anesthesia Post-op Note    CYSTOSCOPY LEFT RETROGRADE PYLOGRAM, LEFT URETEROSCOPY WITH LASER LITHOTRIPSY, INSERTION OF LEFT URETERAL STENT    Procedure Summary       Date: 02/21/25 Room / Location:  MAIN OR  /  MAIN OR    Anesthesia Start: 1703 Anesthesia Stop: 1759    Procedure: CYSTOSCOPY LEFT RETROGRADE PYLOGRAM, LEFT URETEROSCOPY WITH LASER LITHOTRIPSY, INSERTION OF LEFT URETERAL STENT (Left: Ureter) Diagnosis:       Kidney stone      (Kidney stone [N20.0])    Surgeons: Ivan Wolf MD Anesthesiologist: Albert Ortega MD    Anesthesia Type: general ASA Status: 2            Anesthesia Type: general    Vitals Value Taken Time   /94 02/21/25 1800   Temp 98 °F (36.7 °C) 02/21/25 1800   Pulse 96 02/21/25 1800   Resp 16 02/21/25 1800   SpO2 91 % 02/21/25 1800   Vitals shown include unfiled device data.        Patient Location: PACU    Anesthesia Type: general    Airway Patency: patent and extubated    Postop Pain Control: adequate    Mental Status: mildly sedated but able to meaningfully participate in the post-anesthesia evaluation    Nausea/Vomiting: none    Cardiopulmonary/Hydration status: stable euvolemic    Complications: no apparent anesthesia related complications    Postop vital signs: stable    Dental Exam: Unchanged from Preop    Patient to be discharged home when criteria met.

## 2025-02-22 NOTE — PLAN OF CARE
Discharge instructions reviewed with patient. All questions answered. Bedside belongings packed up and returned to patient. Script for oxy given to pt. Will be discharged to home.

## 2025-03-03 LAB
CAOX DIHYDRATE: 70 %
HYDROXYAPATITE: 30 %
WEIGHT-STONE: 6 MG

## 2025-03-18 ENCOUNTER — TELEMEDICINE (OUTPATIENT)
Dept: INTEGRATIVE MEDICINE | Facility: CLINIC | Age: 25
End: 2025-03-18
Payer: COMMERCIAL

## 2025-03-18 DIAGNOSIS — Z86.19 HISTORY OF MONONUCLEOSIS: ICD-10-CM

## 2025-03-18 DIAGNOSIS — D84.1 DISORDER OF COMPLEMENT (HCC): ICD-10-CM

## 2025-03-18 DIAGNOSIS — R53.82 CHRONIC FATIGUE: ICD-10-CM

## 2025-03-18 DIAGNOSIS — E88.819 INSULIN RESISTANCE: ICD-10-CM

## 2025-03-18 DIAGNOSIS — R65.10 SYSTEMIC INFLAMMATORY RESPONSE SYNDROME (HCC): ICD-10-CM

## 2025-03-18 DIAGNOSIS — R29.90 NEUROLOGICAL DYSFUNCTION: ICD-10-CM

## 2025-03-18 DIAGNOSIS — G90.1 DYSAUTONOMIA (HCC): ICD-10-CM

## 2025-03-18 PROCEDURE — 98006 SYNCH AUDIO-VIDEO EST MOD 30: CPT | Performed by: PHYSICIAN ASSISTANT

## 2025-03-18 RX ORDER — METFORMIN HYDROCHLORIDE 750 MG/1
750 TABLET, EXTENDED RELEASE ORAL DAILY
Qty: 90 TABLET | Refills: 1 | Status: SHIPPED | OUTPATIENT
Start: 2025-03-18 | End: 2025-09-14

## 2025-03-18 NOTE — PATIENT INSTRUCTIONS
Plan       1)   GI hist - Increase to 2 three times a day for the next 7 days then decrease to twice a day   Mitocore - continue   Methly b complex- continue   RiaGev®-SR Niacinamide 160mg + Riboside 600mg - continue for depression   You can decrease and see how your body responds     Go off one of the below for 14 days and monitor focus.   Panax ginseng -        Add below for 30 days then stop   Supplement recommendations:  Th1 Support (120 capsules) (Pure Encapsulations): Please take  2 capsules x once per day / anytime  ongoing.   Th2 Modulator (120 capsules) (Pure Encapsulations): Please take  2 capsules x once per day / anytime  ongoing      2) future I would recommend serum mold toxin testing and more detailed micronutrient, Lyme testing  Possible treatment with peptides and/or IV replacement therapy to target immune dysregulation       Thank you for allowing me to participate in your care. I am unfortunately leaving [facility] and my last day will be March 27th. Below are resources for you to continue care in our health system.     Integrative Medicine - [address] [place] [address] [address], [place], [address]    · BRADLEY Corley    To learn more about this provider, or to make an appointment, I encourage you to view her profile online at [email or web address] or call the office at [phone].    Integrative Medicine - [facility] [place], [address]    · Lydia Arvizu MD, NARA    · He Sherman MD, FACP, NARA    · Dr. Cecilia Ibarra MD    · Kwadwo Harvey MD    To learn more about these providers, or to make an appointment, I encourage you to view their profiles online at [email or web address] or call the office at [phone].

## 2025-03-18 NOTE — PROGRESS NOTES
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 Reactions    Gadolinium HIVES, RASH and OTHER (SEE COMMENTS)    Iron ANAPHYLAXIS and OTHER (SEE COMMENTS)    Iron Dextran ANAPHYLAXIS     Pt. Reacted to test dose       Radiology Contrast Iodinated Dyes ANAPHYLAXIS, HIVES, RASH and OTHER (SEE COMMENTS)     MRI contrast  1/27/25 pt will pre-medicate prior to first ever CT w/ contrast    Adhesive Tape RASH and ITCHING    Drug [Skin Adhesives] RASH

## 2025-08-10 ENCOUNTER — HOSPITAL ENCOUNTER (EMERGENCY)
Age: 25
Discharge: HOME | End: 2025-08-10
Attending: EMERGENCY MEDICINE

## 2025-08-10 VITALS
WEIGHT: 250 LBS | HEIGHT: 66 IN | RESPIRATION RATE: 22 BRPM | SYSTOLIC BLOOD PRESSURE: 129 MMHG | OXYGEN SATURATION: 96 % | HEART RATE: 78 BPM | BODY MASS INDEX: 40.18 KG/M2 | DIASTOLIC BLOOD PRESSURE: 74 MMHG | TEMPERATURE: 99 F

## 2025-08-10 DIAGNOSIS — G90.A POTS (POSTURAL ORTHOSTATIC TACHYCARDIA SYNDROME): Primary | ICD-10-CM

## 2025-08-10 DIAGNOSIS — R07.89 CHEST PAIN, ATYPICAL: ICD-10-CM

## 2025-08-10 DIAGNOSIS — R42 DIZZINESS: ICD-10-CM

## 2025-08-10 LAB
ALBUMIN SERPL-MCNC: 5 G/DL (ref 3.2–4.8)
ALBUMIN/GLOB SERPL: 1.8 (ref 1–2)
ALP LIVER SERPL-CCNC: 85 U/L (ref 52–144)
ALT SERPL-CCNC: 121 U/L (ref 10–49)
ANION GAP SERPL CALC-SCNC: 12 MMOL/L (ref 0–18)
AST SERPL-CCNC: 45 U/L (ref ?–34)
ATRIAL RATE: 111 BPM
BASOPHILS # BLD AUTO: 0.01 X10(3) UL (ref 0–0.2)
BASOPHILS NFR BLD AUTO: 0.1 %
BILIRUB SERPL-MCNC: <0.2 MG/DL (ref 0.3–1.2)
BUN BLD-MCNC: 13 MG/DL (ref 9–23)
CALCIUM BLD-MCNC: 10 MG/DL (ref 8.7–10.6)
CHLORIDE SERPL-SCNC: 102 MMOL/L (ref 98–112)
CO2 SERPL-SCNC: 23 MMOL/L (ref 21–32)
CREAT BLD-MCNC: 0.85 MG/DL (ref 0.55–1.02)
EGFRCR SERPLBLD CKD-EPI 2021: 99 ML/MIN/1.73M2 (ref 60–?)
EOSINOPHIL # BLD AUTO: 0 X10(3) UL (ref 0–0.7)
EOSINOPHIL NFR BLD AUTO: 0 %
ERYTHROCYTE [DISTWIDTH] IN BLOOD BY AUTOMATED COUNT: 18.6 %
GLOBULIN PLAS-MCNC: 2.8 G/DL (ref 2–3.5)
GLUCOSE BLD-MCNC: 137 MG/DL (ref 70–99)
HCT VFR BLD AUTO: 37.4 % (ref 35–48)
HGB BLD-MCNC: 11.4 G/DL (ref 12–16)
IMM GRANULOCYTES # BLD AUTO: 0.07 X10(3) UL (ref 0–1)
IMM GRANULOCYTES NFR BLD: 0.5 %
LYMPHOCYTES # BLD AUTO: 1.14 X10(3) UL (ref 1–4)
LYMPHOCYTES NFR BLD AUTO: 7.9 %
MCH RBC QN AUTO: 23.7 PG (ref 26–34)
MCHC RBC AUTO-ENTMCNC: 30.5 G/DL (ref 31–37)
MCV RBC AUTO: 77.8 FL (ref 80–100)
MONOCYTES # BLD AUTO: 0.26 X10(3) UL (ref 0.1–1)
MONOCYTES NFR BLD AUTO: 1.8 %
NEUTROPHILS # BLD AUTO: 12.94 X10 (3) UL (ref 1.5–7.7)
NEUTROPHILS # BLD AUTO: 12.94 X10(3) UL (ref 1.5–7.7)
NEUTROPHILS NFR BLD AUTO: 89.7 %
OSMOLALITY SERPL CALC.SUM OF ELEC: 286 MOSM/KG (ref 275–295)
P AXIS: 23 DEGREES
P-R INTERVAL: 130 MS
PLATELET # BLD AUTO: 496 10(3)UL (ref 150–450)
POTASSIUM SERPL-SCNC: 4.2 MMOL/L (ref 3.5–5.1)
PROT SERPL-MCNC: 7.8 G/DL (ref 5.7–8.2)
Q-T INTERVAL: 314 MS
QRS DURATION: 72 MS
QTC CALCULATION (BEZET): 427 MS
R AXIS: 23 DEGREES
RBC # BLD AUTO: 4.81 X10(6)UL (ref 3.8–5.3)
SODIUM SERPL-SCNC: 137 MMOL/L (ref 136–145)
T AXIS: 10 DEGREES
TROPONIN I SERPL HS-MCNC: <3 NG/L (ref ?–34)
TSI SER-ACNC: 0.78 UIU/ML (ref 0.55–4.78)
VENTRICULAR RATE: 111 BPM
WBC # BLD AUTO: 14.4 X10(3) UL (ref 4–11)

## 2025-08-10 PROCEDURE — 85025 COMPLETE CBC W/AUTO DIFF WBC: CPT | Performed by: PHYSICIAN ASSISTANT

## 2025-08-10 PROCEDURE — 96374 THER/PROPH/DIAG INJ IV PUSH: CPT

## 2025-08-10 PROCEDURE — 93005 ELECTROCARDIOGRAM TRACING: CPT

## 2025-08-10 PROCEDURE — 85025 COMPLETE CBC W/AUTO DIFF WBC: CPT | Performed by: EMERGENCY MEDICINE

## 2025-08-10 PROCEDURE — 93010 ELECTROCARDIOGRAM REPORT: CPT

## 2025-08-10 PROCEDURE — 84443 ASSAY THYROID STIM HORMONE: CPT | Performed by: PHYSICIAN ASSISTANT

## 2025-08-10 PROCEDURE — 84484 ASSAY OF TROPONIN QUANT: CPT | Performed by: PHYSICIAN ASSISTANT

## 2025-08-10 PROCEDURE — 80053 COMPREHEN METABOLIC PANEL: CPT | Performed by: EMERGENCY MEDICINE

## 2025-08-10 PROCEDURE — 99284 EMERGENCY DEPT VISIT MOD MDM: CPT

## 2025-08-10 PROCEDURE — 96361 HYDRATE IV INFUSION ADD-ON: CPT

## 2025-08-10 PROCEDURE — 80053 COMPREHEN METABOLIC PANEL: CPT | Performed by: PHYSICIAN ASSISTANT

## 2025-08-10 RX ORDER — ONDANSETRON 2 MG/ML
4 INJECTION INTRAMUSCULAR; INTRAVENOUS ONCE
Status: COMPLETED | OUTPATIENT
Start: 2025-08-10 | End: 2025-08-10

## 2025-08-25 ENCOUNTER — APPOINTMENT (OUTPATIENT)
Dept: CT IMAGING | Age: 25
End: 2025-08-25
Attending: EMERGENCY MEDICINE

## 2025-08-25 ENCOUNTER — HOSPITAL ENCOUNTER (EMERGENCY)
Age: 25
Discharge: HOME | End: 2025-08-25
Attending: EMERGENCY MEDICINE

## 2025-08-25 VITALS
HEART RATE: 92 BPM | OXYGEN SATURATION: 100 % | RESPIRATION RATE: 18 BRPM | SYSTOLIC BLOOD PRESSURE: 122 MMHG | WEIGHT: 250 LBS | HEIGHT: 66 IN | BODY MASS INDEX: 40.18 KG/M2 | DIASTOLIC BLOOD PRESSURE: 83 MMHG | TEMPERATURE: 98 F

## 2025-08-25 DIAGNOSIS — R19.7 DIARRHEA, UNSPECIFIED TYPE: ICD-10-CM

## 2025-08-25 DIAGNOSIS — E86.0 DEHYDRATION: ICD-10-CM

## 2025-08-25 DIAGNOSIS — R11.0 NAUSEA: Primary | ICD-10-CM

## 2025-08-25 LAB
ALBUMIN SERPL-MCNC: 5.1 G/DL (ref 3.2–4.8)
ALBUMIN/GLOB SERPL: 1.8 (ref 1–2)
ALP LIVER SERPL-CCNC: 86 U/L (ref 52–144)
ALT SERPL-CCNC: 109 U/L (ref 10–49)
ANION GAP SERPL CALC-SCNC: 11 MMOL/L (ref 0–18)
AST SERPL-CCNC: 48 U/L (ref ?–34)
B-HCG UR QL: NEGATIVE
BASOPHILS # BLD AUTO: 0.03 X10(3) UL (ref 0–0.2)
BASOPHILS NFR BLD AUTO: 0.4 %
BILIRUB SERPL-MCNC: 0.2 MG/DL (ref 0.3–1.2)
BILIRUB UR QL STRIP.AUTO: NEGATIVE
BUN BLD-MCNC: 10 MG/DL (ref 9–23)
CALCIUM BLD-MCNC: 10 MG/DL (ref 8.7–10.6)
CHLORIDE SERPL-SCNC: 102 MMOL/L (ref 98–112)
CO2 SERPL-SCNC: 25 MMOL/L (ref 21–32)
COLOR UR AUTO: YELLOW
CREAT BLD-MCNC: 0.96 MG/DL (ref 0.55–1.02)
EGFRCR SERPLBLD CKD-EPI 2021: 85 ML/MIN/1.73M2 (ref 60–?)
EOSINOPHIL # BLD AUTO: 0.17 X10(3) UL (ref 0–0.7)
EOSINOPHIL NFR BLD AUTO: 2.1 %
ERYTHROCYTE [DISTWIDTH] IN BLOOD BY AUTOMATED COUNT: 19.2 %
GLOBULIN PLAS-MCNC: 2.8 G/DL (ref 2–3.5)
GLUCOSE BLD-MCNC: 92 MG/DL (ref 70–99)
GLUCOSE UR STRIP.AUTO-MCNC: NEGATIVE MG/DL
HCT VFR BLD AUTO: 38 % (ref 35–48)
HGB BLD-MCNC: 11.9 G/DL (ref 12–16)
IMM GRANULOCYTES # BLD AUTO: 0.02 X10(3) UL (ref 0–1)
IMM GRANULOCYTES NFR BLD: 0.3 %
LIPASE SERPL-CCNC: 33 U/L (ref 12–53)
LYMPHOCYTES # BLD AUTO: 2.4 X10(3) UL (ref 1–4)
LYMPHOCYTES NFR BLD AUTO: 30.3 %
MCH RBC QN AUTO: 24.4 PG (ref 26–34)
MCHC RBC AUTO-ENTMCNC: 31.3 G/DL (ref 31–37)
MCV RBC AUTO: 77.9 FL (ref 80–100)
MONOCYTES # BLD AUTO: 0.7 X10(3) UL (ref 0.1–1)
MONOCYTES NFR BLD AUTO: 8.8 %
NEUTROPHILS # BLD AUTO: 4.61 X10 (3) UL (ref 1.5–7.7)
NEUTROPHILS # BLD AUTO: 4.61 X10(3) UL (ref 1.5–7.7)
NEUTROPHILS NFR BLD AUTO: 58.1 %
NITRITE UR QL STRIP.AUTO: NEGATIVE
OSMOLALITY SERPL CALC.SUM OF ELEC: 285 MOSM/KG (ref 275–295)
PH UR STRIP.AUTO: 6.5 (ref 5–8)
PLATELET # BLD AUTO: 373 10(3)UL (ref 150–450)
POTASSIUM SERPL-SCNC: 4.2 MMOL/L (ref 3.5–5.1)
PROT SERPL-MCNC: 7.9 G/DL (ref 5.7–8.2)
PROT UR STRIP.AUTO-MCNC: >=300 MG/DL
RBC # BLD AUTO: 4.88 X10(6)UL (ref 3.8–5.3)
RBC UR QL AUTO: NEGATIVE
SODIUM SERPL-SCNC: 138 MMOL/L (ref 136–145)
SP GR UR STRIP.AUTO: >=1.03 (ref 1–1.03)
UROBILINOGEN UR STRIP.AUTO-MCNC: 0.2 MG/DL
WBC # BLD AUTO: 7.9 X10(3) UL (ref 4–11)

## 2025-08-25 PROCEDURE — 87086 URINE CULTURE/COLONY COUNT: CPT | Performed by: EMERGENCY MEDICINE

## 2025-08-25 PROCEDURE — 99285 EMERGENCY DEPT VISIT HI MDM: CPT

## 2025-08-25 PROCEDURE — 96375 TX/PRO/DX INJ NEW DRUG ADDON: CPT

## 2025-08-25 PROCEDURE — 85025 COMPLETE CBC W/AUTO DIFF WBC: CPT | Performed by: EMERGENCY MEDICINE

## 2025-08-25 PROCEDURE — 80053 COMPREHEN METABOLIC PANEL: CPT | Performed by: EMERGENCY MEDICINE

## 2025-08-25 PROCEDURE — 96374 THER/PROPH/DIAG INJ IV PUSH: CPT

## 2025-08-25 PROCEDURE — 93005 ELECTROCARDIOGRAM TRACING: CPT

## 2025-08-25 PROCEDURE — 83690 ASSAY OF LIPASE: CPT | Performed by: EMERGENCY MEDICINE

## 2025-08-25 PROCEDURE — 74176 CT ABD & PELVIS W/O CONTRAST: CPT | Performed by: EMERGENCY MEDICINE

## 2025-08-25 PROCEDURE — 81015 MICROSCOPIC EXAM OF URINE: CPT | Performed by: EMERGENCY MEDICINE

## 2025-08-25 PROCEDURE — 96361 HYDRATE IV INFUSION ADD-ON: CPT

## 2025-08-25 PROCEDURE — 96372 THER/PROPH/DIAG INJ SC/IM: CPT

## 2025-08-25 PROCEDURE — 81001 URINALYSIS AUTO W/SCOPE: CPT | Performed by: EMERGENCY MEDICINE

## 2025-08-25 PROCEDURE — 81025 URINE PREGNANCY TEST: CPT

## 2025-08-25 RX ORDER — METOCLOPRAMIDE HYDROCHLORIDE 5 MG/ML
10 INJECTION INTRAMUSCULAR; INTRAVENOUS ONCE
Status: COMPLETED | OUTPATIENT
Start: 2025-08-25 | End: 2025-08-25

## 2025-08-25 RX ORDER — ONDANSETRON 2 MG/ML
4 INJECTION INTRAMUSCULAR; INTRAVENOUS ONCE
Status: COMPLETED | OUTPATIENT
Start: 2025-08-25 | End: 2025-08-25

## 2025-08-25 RX ORDER — DIPHENHYDRAMINE HYDROCHLORIDE 50 MG/ML
25 INJECTION, SOLUTION INTRAMUSCULAR; INTRAVENOUS ONCE
Status: COMPLETED | OUTPATIENT
Start: 2025-08-25 | End: 2025-08-25

## 2025-08-25 RX ORDER — FAMOTIDINE 10 MG/ML
20 INJECTION, SOLUTION INTRAVENOUS ONCE
Status: COMPLETED | OUTPATIENT
Start: 2025-08-25 | End: 2025-08-25

## 2025-08-25 RX ORDER — DICYCLOMINE HYDROCHLORIDE 10 MG/ML
10 INJECTION INTRAMUSCULAR ONCE
Status: COMPLETED | OUTPATIENT
Start: 2025-08-25 | End: 2025-08-25

## 2025-08-25 RX ORDER — PROCHLORPERAZINE MALEATE 10 MG
5 TABLET ORAL EVERY 8 HOURS PRN
Qty: 6 TABLET | Refills: 0 | Status: SHIPPED | OUTPATIENT
Start: 2025-08-25

## 2025-08-25 RX ORDER — PROCHLORPERAZINE EDISYLATE 5 MG/ML
5 INJECTION INTRAMUSCULAR; INTRAVENOUS ONCE
Status: COMPLETED | OUTPATIENT
Start: 2025-08-25 | End: 2025-08-25

## 2025-08-25 RX ORDER — DICYCLOMINE HCL 20 MG
20 TABLET ORAL 4 TIMES DAILY PRN
Qty: 15 TABLET | Refills: 0 | Status: SHIPPED | OUTPATIENT
Start: 2025-08-25

## 2025-08-26 LAB
ATRIAL RATE: 91 BPM
P AXIS: 21 DEGREES
P-R INTERVAL: 116 MS
Q-T INTERVAL: 358 MS
QRS DURATION: 76 MS
QTC CALCULATION (BEZET): 440 MS
R AXIS: 10 DEGREES
T AXIS: 136 DEGREES
VENTRICULAR RATE: 91 BPM

## (undated) DEVICE — ADHESIVE LIQ 2/3ML VI MASTISOL

## (undated) DEVICE — 3M(TM) TEGADERM(TM) TRANSPARENT FILM DRESSING FRAME STYLE 9505W: Brand: 3M™ TEGADERM™

## (undated) DEVICE — FLEXIVA  PULSE  AND  FLEXIVA  PULSE  TRACTIP  LASER  FIBERS  ARE  HIGH  POWER  SINGLE-USE FIBER: Brand: FLEXIVA PULSE ID

## (undated) DEVICE — NITINOL WIRE WITH HYDROPHILIC TIP: Brand: SENSOR

## (undated) DEVICE — STAPLER ENDO GUN GIA 30 2.5 V

## (undated) DEVICE — VIOLET BRAIDED (POLYGLACTIN 910), SYNTHETIC ABSORBABLE SUTURE: Brand: COATED VICRYL

## (undated) DEVICE — ENDOPATH XCEL WITH OPTIVIEW TECHNOLOGY BLADELESS TROCARS WITH STABILITY SLEEVES: Brand: ENDOPATH XCEL OPTIVIEW

## (undated) DEVICE — SINGLE-USE DIGITAL FLEXIBLE URETEROSCOPE: Brand: LITHOVUE

## (undated) DEVICE — ENDO POUCH

## (undated) DEVICE — 3M™ TEGADERM™ TRANSPARENT FILM DRESSING, 1626W, 4 IN X 4-3/4 IN (10 CM X 12 CM), 50 EACH/CARTON, 4 CARTON/CASE: Brand: 3M™ TEGADERM™

## (undated) DEVICE — GAUZE SPONGES,USP TYPE VII GAUZE, 12 PLY: Brand: CURITY

## (undated) DEVICE — ENDOPATH XCEL UNIVERSAL TROCAR STABLILITY SLEEVES: Brand: ENDOPATH XCEL

## (undated) DEVICE — PACK PBDS CYSTOSCOPY

## (undated) DEVICE — 20 ML SYRINGE LUER-LOCK TIP: Brand: MONOJECT

## (undated) DEVICE — CHLORAPREP 26ML APPLICATOR

## (undated) DEVICE — SOLUTION IRRIG 3000ML 0.9% NACL FLX CONT

## (undated) DEVICE — LAP CHOLE/APPY CDS-LF: Brand: MEDLINE INDUSTRIES, INC.

## (undated) DEVICE — ENDOPATH XCEL BLUNT TIP TROCARS WITH SMOOTH SLEEVES: Brand: ENDOPATH XCEL

## (undated) DEVICE — NITINOL STONE RETRIEVAL BASKET: Brand: ZERO TIP

## (undated) DEVICE — GLOVE SUR 7.5 SENSICARE PI PIP CRM PWD F

## (undated) DEVICE — KENDALL SCD EXPRESS SLEEVES, KNEE LENGTH, MEDIUM: Brand: KENDALL SCD

## (undated) DEVICE — SYRINGE MED 10ML LL TIP W/O SFTY DISP

## (undated) DEVICE — SYRINGE MED 20ML STD CLR PLAS LL TIP N CTRL

## (undated) DEVICE — DISSECTOR SONICISION CORDLESS

## (undated) DEVICE — SLEEVE COMPR MD KNEE LEN SGL USE KENDALL SCD

## (undated) DEVICE — UNDYED BRAIDED (POLYGLACTIN 910), SYNTHETIC ABSORBABLE SUTURE: Brand: COATED VICRYL

## (undated) DEVICE — REM POLYHESIVE ADULT PATIENT RETURN ELECTRODE: Brand: VALLEYLAB

## (undated) DEVICE — GLOVE SUR 8 SENSICARE NEOPR PWD F

## (undated) DEVICE — GLOVE ORTHO ALOETOUCH SZ 8-1/2

## (undated) NOTE — LETTER
Date & Time: 2/5/2020  Patient: Rubi Sherman  Encounter Provider(s):    Jose Juan Galicia, DO       To Whom It May Concern:    Damion Beard was seen and treated in our department on 2/4/2020. She should not return to school until 2/6/19.     If you h

## (undated) NOTE — LETTER
Date & Time: 2/5/2020, 2:41 AM  Patient: Eden Clayton  Encounter Provider(s):    Sharie Gaucher, DO       To Whom It May Concern:    Ralf Page was seen and treated in our department on 2/4/2020.  She may return to school on 2/6/2020    If you h

## (undated) NOTE — ED AVS SNAPSHOT
Angeles Dodd   MRN: [id number]    Department:  [facility] Emergency Department   Date of Visit:  2/4/2020           Disclosure     Insurance plans vary and the physician(s) referred by the ER may not be covered by your plan.  Please contact your tell this physician (or your personal doctor if your instructions are to return to your personal doctor) about any new or lasting problems. The primary care or specialist physician will see patients referred from the [facility] Emergency Department.  Danny Cardenas

## (undated) NOTE — LETTER
[facility]  [address] [facility], [address] [place] [address]    Consent for Operation    Date: __________________    Time: _______________    1.  I authorize the performance upon Yuliana Parisi the following operation:    Procedure(s):  Laparoscopic appendec procedure has been videotaped, the surgeon will obtain the original videotape. The hospital will not be responsible for storage or maintenance of this tape.     6. For the purpose of advancing medical education, I consent to the admittance of observers to t STATEMENTS REQUIRING INSERTION OR COMPLETION WERE FILLED IN.     Signature of Patient:   ___________________________    When the patient is a minor or mentally incompetent to give consent:  Signature of person authorized to consent for patient: ____________ drugs/illegal medications). Failure to inform my anesthesiologist about these medicines may increase my risk of anesthetic complications. · If I am allergic to anything or have had a reaction to anesthesia before.     3. I understand how the anesthesia med I have discussed the procedure and information above with the patient (or patient’s representative) and answered their questions. The patient or their representative has agreed to have anesthesia services.     _______________________________________________

## (undated) NOTE — ED AVS SNAPSHOT
[facility] Emergency Department    [place]  [address] [address]    Phone:  [phone]    Fax:  [address]   MRN: [id number]    Department:  [facility] Emergency Department   Date of Visit:  4/12/ IF THERE IS ANY CHANGE OR WORSENING OF YOUR CONDITION, CALL YOUR PRIMARY CARE PHYSICIAN AT ONCE OR RETURN IMMEDIATELY TO THE EMERGENCY DEPARTMENT.     If you have been prescribed any medication(s), please fill your prescription right away and begin taking t

## (undated) NOTE — LETTER
[address]  [address]  Authorization for Surgical Operation and Procedure     Date:___________                                                                                                         Time:__________  I hereby authorize Surgeon(s):  Ivan Wolf MD, my physician and his/her assistants (if applicable), which may include medical students, residents, and/or fellows, to perform the following surgical operation/ procedure and administer such anesthesia as may be determined necessary by my physician:  Operation/Procedure name (s) Procedure(s):  CYSTOSCOPY LEFT RETROGRADE PYLOGRAM, LEFT URETEROSCOPY WITH LASER LITHOTRIPSY, INSERTION OF LEFT URETERAL STENT on Vicki Chavez   2.   I recognize that during the surgical operation/procedure, unforeseen conditions may necessitate additional or different procedures than those listed above.  I, therefore, further authorize and request that the above-named surgeon, assistants, or designees perform such procedures as are, in their judgment, necessary and desirable.    3.   My surgeon/physician has discussed prior to my surgery the potential benefits, risks and side effects of this procedure; the likelihood of achieving goals; and potential problems that might occur during recuperation.  They also discussed reasonable alternatives to the procedure, including risks, benefits, and side effects related to the alternatives and risks related to not receiving this procedure.  I have had all my questions answered and I acknowledge that no guarantee has been made as to the result that may be obtained.    4.   Should the need arise during my operation/procedure, which includes change of level of care prior to discharge, I also consent to the administration of blood and/or blood products.  Further, I understand that despite careful testing and screening of blood or blood products by collecting agencies, I may still be subject to  ill effects as a result of receiving a blood transfusion and/or blood products.  The following are some, but not all, of the potential risks that can occur: fever and allergic reactions, hemolytic reactions, transmission of diseases such as Hepatitis, AIDS and Cytomegalovirus (CMV) and fluid overload.  In the event that I wish to have an autologous transfusion of my own blood, or a directed donor transfusion, I will discuss this with my physician.  Check only if Refusing Blood or Blood Products  I understand refusal of blood or blood products as deemed necessary by my physician may have serious consequences to my condition to include possible death. I hereby assume responsibility for my refusal and release the hospital, its personnel, and my physicians from any responsibility for the consequences of my refusal.          o  Refuse      5.   I authorize the use of any specimen, organs, tissues, body parts or foreign objects that may be removed from my body during the operation/procedure for diagnosis, research or teaching purposes and their subsequent disposal by hospital authorities.  I also authorize the release of specimen test results and/or written reports to my treating physician on the hospital medical staff or other referring or consulting physicians involved in my care, at the discretion of the Pathologist or my treating physician.    6.   I consent to the photographing or videotaping of the operations or procedures to be performed, including appropriate portions of my body for medical, scientific, or educational purposes, provided my identity is not revealed by the pictures or by descriptive texts accompanying them.  If the procedure has been photographed/videotaped, the surgeon will obtain the original picture, image, videotape or CD.  The hospital will not be responsible for storage, release or maintenance of the picture, image, tape or CD.    7.   I consent to the presence of a  or  observers in the operating room as deemed necessary by my physician or their designees.    8.   I recognize that in the event my procedure results in extended X-Ray/fluoroscopy time, I may develop a skin reaction.    9. If I have a Do Not Attempt Resuscitation (DNAR) order in place, that status will be suspended while in the operating room, procedural suite, and during the recovery period unless otherwise explicitly stated by me (or a person authorized to consent on my behalf). The surgeon or my attending physician will determine when the applicable recovery period ends for purposes of reinstating the DNAR order.  10. Patients having a sterilization procedure: I understand that if the procedure is successful the results will be permanent and it will therefore be impossible for me to inseminate, conceive, or bear children.  I also understand that the procedure is intended to result in sterility, although the result has not been guaranteed.   11. I acknowledge that my physician has explained sedation/analgesia administration to me including the risk and benefits I consent to the administration of sedation/analgesia as may be necessary or desirable in the judgment of my physician.    I CERTIFY THAT I HAVE READ AND FULLY UNDERSTAND THE ABOVE CONSENT TO OPERATION and/or OTHER PROCEDURE.    _________________________________________  __________________________________  Signature of Patient     Signature of Responsible Person         ___________________________________         Printed Name of Responsible Person           _________________________________                 Relationship to Patient  _________________________________________  ______________________________  Signature of Witness          Date  Time      Patient Name: Vicki Chavez     : 2000                 Printed: 2025     Medical Record #: [id number]                     Page 1 of 2                                    Edward  [address]  [address]    Consent for Anesthesia    I, Vicki Chavez agree to be cared for by an anesthesiologist, who is specially trained to monitor me and give me medicine to put me to sleep or keep me comfortable during my procedure    I understand that my anesthesiologist is not an employee or agent of [facility] [employer]. He or she works for [employer].    As the patient asking for anesthesia services, I agree to:  Allow the anesthesiologist (anesthesia doctor) to give me medicine and do additional procedures as necessary. Some examples are: Starting or using an “IV” to give me medicine, fluids or blood during my procedure, and having a breathing tube placed to help me breathe when I’m asleep (intubation). In the event that my heart stops working properly, I understand that my anesthesiologist will make every effort to sustain my life, unless otherwise directed by [facility] Do Not Resuscitate documents.  Tell my anesthesia doctor before my procedure:  If I am pregnant.  The last time that I ate or drank.  All of the medicines I take (including prescriptions, herbal supplements, and pills I can buy without a prescription (including street drugs/illegal medications). Failure to inform my anesthesiologist about these medicines may increase my risk of anesthetic complications.  If I am allergic to anything or have had a reaction to anesthesia before.  I understand how the anesthesia medicine will help me (benefits).  I understand that with any type of anesthesia medicine there are risks:  The most common risks are: nausea, vomiting, sore throat, muscle soreness, damage to my eyes, mouth, or teeth (from breathing tube placement).  Rare risks include: remembering what happened during my procedure, allergic reactions to medications, injury to my airway, heart, lungs, vision, nerves, or muscles and in extremely rare instances  death.  My doctor has explained to me other choices available to me for my care (alternatives).  Pregnant Patients (“epidural”):  I understand that the risks of having an epidural (medicine given into my back to help control pain during labor), include itching, low blood pressure, difficulty urinating, headache or slowing of the baby’s heart. Very rare risks include infection, bleeding, seizure, irregular heart rhythms and nerve injury.  Regional Anesthesia (“spinal”, “epidural”, & “nerve blocks”):  I understand that rare but potential complications include headache, bleeding, infection, seizure, irregular heart rhythms, and nerve injury.    I can change my mind about having anesthesia services at any time before I get the medicine.    _____________________________________________________________________________  Patient (or Representative) Signature/Relationship to Patient  Date   Time    _____________________________________________________________________________   Name (if used)    Language/Organization   Time    _____________________________________________________________________________  Anesthesiologist Signature     Date   Time  I have discussed the procedure and information above with the patient (or patient’s representative) and answered their questions. The patient or their representative has agreed to have anesthesia services.    _____________________________________________________________________________  Witness        Date   Time  I have verified that the signature is that of the patient or patient’s representative, and that it was signed before the procedure  Patient Name: Vicki Chavez     : 2000                 Printed: 2025     Medical Record #: [id number]                     Page 2 of 2

## (undated) NOTE — ED AVS SNAPSHOT
[facility] Emergency Department    [facility]  [address] [address]    Phone:  [phone]    Fax:  [address]   MRN: [id number]    Department:  [facility] Emergency Department   Date of Visit:  4/12/ If you have any problems with your follow-up, please call our  at [phone]    Si usted tiene algun problema con purdy sequimiento, por favor llame a nuestro adminstrador de casos al [phone]    Expect to receive an electronic request Jenna Levi [address] ([address]) Blake Cao[address] [address]   [facility] [address] ([address] [facility]) [address]  (Do

## (undated) NOTE — LETTER
Date & Time: 10/26/2023, 3:08 AM  Patient: Ney Montesinos  Encounter Provider(s):    Rich Morocho DO       To Whom It May Concern:    Efrem Watkins was seen and treated in our department on 10/25/2023. She  may return to her outpatient treatment program on 10/30/2023.     If you have any questions or concerns, please do not hesitate to call.        _____________________________  Physician/APC Signature

## (undated) NOTE — LETTER
[address]  [address]  Authorization for Surgical Operation and Procedure     Date:___________                                                                                                         Time:__________  I hereby authorize Surgeon(s):  Maynor Anderson MD, my physician and his/her assistants (if applicable), which may include medical students, residents, and/or fellows, to perform the following surgical operation/ procedure and administer such anesthesia as may be determined necessary by my physician:  Operation/Procedure name (s) Procedure(s):  CYSTOSCOPY LEFT RETROGRADE PYLOGRAM, LEFT URETEROSCOPY WITH LASER LITHOTRIPSY, INSERTION OF LEFT URETERAL STENT on Vicki Chavez   2.   I recognize that during the surgical operation/procedure, unforeseen conditions may necessitate additional or different procedures than those listed above.  I, therefore, further authorize and request that the above-named surgeon, assistants, or designees perform such procedures as are, in their judgment, necessary and desirable.    3.   My surgeon/physician has discussed prior to my surgery the potential benefits, risks and side effects of this procedure; the likelihood of achieving goals; and potential problems that might occur during recuperation.  They also discussed reasonable alternatives to the procedure, including risks, benefits, and side effects related to the alternatives and risks related to not receiving this procedure.  I have had all my questions answered and I acknowledge that no guarantee has been made as to the result that may be obtained.    4.   Should the need arise during my operation/procedure, which includes change of level of care prior to discharge, I also consent to the administration of blood and/or blood products.  Further, I understand that despite careful testing and screening of blood or blood products by collecting agencies, I may still be  subject to ill effects as a result of receiving a blood transfusion and/or blood products.  The following are some, but not all, of the potential risks that can occur: fever and allergic reactions, hemolytic reactions, transmission of diseases such as Hepatitis, AIDS and Cytomegalovirus (CMV) and fluid overload.  In the event that I wish to have an autologous transfusion of my own blood, or a directed donor transfusion, I will discuss this with my physician.  Check only if Refusing Blood or Blood Products  I understand refusal of blood or blood products as deemed necessary by my physician may have serious consequences to my condition to include possible death. I hereby assume responsibility for my refusal and release the hospital, its personnel, and my physicians from any responsibility for the consequences of my refusal.          o  Refuse      5.   I authorize the use of any specimen, organs, tissues, body parts or foreign objects that may be removed from my body during the operation/procedure for diagnosis, research or teaching purposes and their subsequent disposal by hospital authorities.  I also authorize the release of specimen test results and/or written reports to my treating physician on the hospital medical staff or other referring or consulting physicians involved in my care, at the discretion of the Pathologist or my treating physician.    6.   I consent to the photographing or videotaping of the operations or procedures to be performed, including appropriate portions of my body for medical, scientific, or educational purposes, provided my identity is not revealed by the pictures or by descriptive texts accompanying them.  If the procedure has been photographed/videotaped, the surgeon will obtain the original picture, image, videotape or CD.  The hospital will not be responsible for storage, release or maintenance of the picture, image, tape or CD.    7.   I consent to the presence of a product  specialist or observers in the operating room as deemed necessary by my physician or their designees.    8.   I recognize that in the event my procedure results in extended X-Ray/fluoroscopy time, I may develop a skin reaction.    9. If I have a Do Not Attempt Resuscitation (DNAR) order in place, that status will be suspended while in the operating room, procedural suite, and during the recovery period unless otherwise explicitly stated by me (or a person authorized to consent on my behalf). The surgeon or my attending physician will determine when the applicable recovery period ends for purposes of reinstating the DNAR order.  10. Patients having a sterilization procedure: I understand that if the procedure is successful the results will be permanent and it will therefore be impossible for me to inseminate, conceive, or bear children.  I also understand that the procedure is intended to result in sterility, although the result has not been guaranteed.   11. I acknowledge that my physician has explained sedation/analgesia administration to me including the risk and benefits I consent to the administration of sedation/analgesia as may be necessary or desirable in the judgment of my physician.    I CERTIFY THAT I HAVE READ AND FULLY UNDERSTAND THE ABOVE CONSENT TO OPERATION and/or OTHER PROCEDURE.    _________________________________________  __________________________________  Signature of Patient     Signature of Responsible Person         ___________________________________         Printed Name of Responsible Person           _________________________________                 Relationship to Patient  _________________________________________  ______________________________  Signature of Witness          Date  Time      Patient Name: Vicki Chavez     : 2000                 Printed: 2025     Medical Record #: [id number]                     Page 1 of 2                                     [address]  [address]    Consent for Anesthesia    I, Vicki Chavez agree to be cared for by an anesthesiologist, who is specially trained to monitor me and give me medicine to put me to sleep or keep me comfortable during my procedure    I understand that my anesthesiologist is not an employee or agent of [facility] or [employer]. He or she works for [employer].    As the patient asking for anesthesia services, I agree to:  Allow the anesthesiologist (anesthesia doctor) to give me medicine and do additional procedures as necessary. Some examples are: Starting or using an “IV” to give me medicine, fluids or blood during my procedure, and having a breathing tube placed to help me breathe when I’m asleep (intubation). In the event that my heart stops working properly, I understand that my anesthesiologist will make every effort to sustain my life, unless otherwise directed by [facility] Do Not Resuscitate documents.  Tell my anesthesia doctor before my procedure:  If I am pregnant.  The last time that I ate or drank.  All of the medicines I take (including prescriptions, herbal supplements, and pills I can buy without a prescription (including street drugs/illegal medications). Failure to inform my anesthesiologist about these medicines may increase my risk of anesthetic complications.  If I am allergic to anything or have had a reaction to anesthesia before.  I understand how the anesthesia medicine will help me (benefits).  I understand that with any type of anesthesia medicine there are risks:  The most common risks are: nausea, vomiting, sore throat, muscle soreness, damage to my eyes, mouth, or teeth (from breathing tube placement).  Rare risks include: remembering what happened during my procedure, allergic reactions to medications, injury to my airway, heart, lungs, vision, nerves, or muscles and in extremely rare  instances death.  My doctor has explained to me other choices available to me for my care (alternatives).  Pregnant Patients (“epidural”):  I understand that the risks of having an epidural (medicine given into my back to help control pain during labor), include itching, low blood pressure, difficulty urinating, headache or slowing of the baby’s heart. Very rare risks include infection, bleeding, seizure, irregular heart rhythms and nerve injury.  Regional Anesthesia (“spinal”, “epidural”, & “nerve blocks”):  I understand that rare but potential complications include headache, bleeding, infection, seizure, irregular heart rhythms, and nerve injury.    I can change my mind about having anesthesia services at any time before I get the medicine.    _____________________________________________________________________________  Patient (or Representative) Signature/Relationship to Patient  Date   Time    _____________________________________________________________________________   Name (if used)    Language/Organization   Time    _____________________________________________________________________________  Anesthesiologist Signature     Date   Time  I have discussed the procedure and information above with the patient (or patient’s representative) and answered their questions. The patient or their representative has agreed to have anesthesia services.    _____________________________________________________________________________  Witness        Date   Time  I have verified that the signature is that of the patient or patient’s representative, and that it was signed before the procedure  Patient Name: Vicki Chavez     : 2000                 Printed: 2025     Medical Record #: [id number]                     Page 2 of 2

## (undated) NOTE — LETTER
April 13, 2017    Patient: Mary Iverson   Date of Visit: 4/12/2017       To Whom It May Concern:    Columba Colón was seen and treated in our emergency department on 4/12/2017. She should not return to school until cleared by primary doctor. .    If yo

## (undated) NOTE — IP AVS SNAPSHOT
[facility] [place] [address] [place] [address], [address] ~ [phone]                Discharge Summary   2/5/2017    Nilda Vanegas           Admission Information        Provider Department    2/5/2017 Michael Chiang MD  1se-B         Thank TraMADol HCl 50 MG Tabs   Commonly known as:  ULTRAM        100 mg every morning.     Victorino Rice                             Where to Get Your Medications      Please  your prescriptions at the location directed by your doctor or nurse     Bring a • Call the office when you arrive home after surgery and make an appointment to see your surgeon in one week. • Should you develop any problems prior to your scheduled appointment, do not hesitate  to call the office.     [id number] [facility]  [phone] 68.6 (02/05/17)  23.9 (02/05/17)  4.9 (02/05/17)  2.1 (02/05/17)  0.3 -- (02/05/17)  5.92 (02/05/17)  2.06 (02/05/17)  0.42 (02/05/17)  0.18 (02/05/17)  8.58      Metabolic Lab Results  (Last result in the past 90 days)    HgbA1C Glucose BUN Creatinine Jose Luis

## (undated) NOTE — ED AVS SNAPSHOT
Eden Clayton   MRN: [id number]    Department:  [facility] Emergency Department   Date of Visit:  1/16/2018           Disclosure     Insurance plans vary and the physician(s) referred by the ER may not be covered by your plan.  Please contact your tell this physician (or your personal doctor if your instructions are to return to your personal doctor) about any new or lasting problems. The primary care or specialist physician will see patients referred from the [facility] Emergency Department.  Laura Flores